# Patient Record
Sex: FEMALE | Race: WHITE | NOT HISPANIC OR LATINO | Employment: OTHER | URBAN - METROPOLITAN AREA
[De-identification: names, ages, dates, MRNs, and addresses within clinical notes are randomized per-mention and may not be internally consistent; named-entity substitution may affect disease eponyms.]

---

## 2017-01-12 ENCOUNTER — TELEPHONE (OUTPATIENT)
Dept: TRANSPLANT | Facility: CLINIC | Age: 77
End: 2017-01-12

## 2017-01-12 NOTE — TELEPHONE ENCOUNTER
Left message for patient to contact office to inform us of her decision regarding our transplant program.    OHT episode will be closed at this time due to pt choice. If patient decides to come to our facility referral process will begin.

## 2019-06-03 NOTE — PROGRESS NOTES
Called and spoke with son Konrad in re procedure time change.  New arrival time for Gen change procedure is now 8am and NOT 6am as prev instructed. Mr Konrad verbalized understanding and stated he will relay message to his mother. No further questions asked.

## 2019-06-04 ENCOUNTER — HOSPITAL ENCOUNTER (OUTPATIENT)
Facility: HOSPITAL | Age: 79
Discharge: HOME | End: 2019-06-05
Attending: INTERNAL MEDICINE | Admitting: INTERNAL MEDICINE
Payer: MEDICARE

## 2019-06-04 DIAGNOSIS — Z45.010 ENCOUNTER FOR PACEMAKER AT END OF BATTERY LIFE: ICD-10-CM

## 2019-06-04 DIAGNOSIS — Z01.818 PREOP TESTING: ICD-10-CM

## 2019-06-04 DIAGNOSIS — Z95.0 PACEMAKER: ICD-10-CM

## 2019-06-04 DIAGNOSIS — I49.9 ARRHYTHMIA: ICD-10-CM

## 2019-06-04 LAB
ANION GAP SERPL CALC-SCNC: 8 MMOL/L (ref 8–16)
APTT BLDCRRT: 30.4 SEC (ref 21–32)
BASOPHILS # BLD AUTO: 0.01 K/UL (ref 0–0.2)
BASOPHILS NFR BLD: 0.2 % (ref 0–1.9)
BUN SERPL-MCNC: 20 MG/DL (ref 8–23)
CALCIUM SERPL-MCNC: 10.2 MG/DL (ref 8.7–10.5)
CHLORIDE SERPL-SCNC: 101 MMOL/L (ref 95–110)
CO2 SERPL-SCNC: 30 MMOL/L (ref 23–29)
CREAT SERPL-MCNC: 1 MG/DL (ref 0.5–1.4)
DIFFERENTIAL METHOD: ABNORMAL
EOSINOPHIL # BLD AUTO: 0.1 K/UL (ref 0–0.5)
EOSINOPHIL NFR BLD: 3.2 % (ref 0–8)
ERYTHROCYTE [DISTWIDTH] IN BLOOD BY AUTOMATED COUNT: 14.5 % (ref 11.5–14.5)
EST. GFR  (AFRICAN AMERICAN): >60 ML/MIN/1.73 M^2
EST. GFR  (NON AFRICAN AMERICAN): 55 ML/MIN/1.73 M^2
GLUCOSE SERPL-MCNC: 96 MG/DL (ref 70–110)
HCT VFR BLD AUTO: 37.9 % (ref 37–48.5)
HGB BLD-MCNC: 11.8 G/DL (ref 12–16)
INR PPP: 1.3 (ref 0.8–1.2)
LYMPHOCYTES # BLD AUTO: 1.7 K/UL (ref 1–4.8)
LYMPHOCYTES NFR BLD: 37.5 % (ref 18–48)
MCH RBC QN AUTO: 28.1 PG (ref 27–31)
MCHC RBC AUTO-ENTMCNC: 31.1 G/DL (ref 32–36)
MCV RBC AUTO: 90 FL (ref 82–98)
MONOCYTES # BLD AUTO: 0.8 K/UL (ref 0.3–1)
MONOCYTES NFR BLD: 18.1 % (ref 4–15)
NEUTROPHILS # BLD AUTO: 1.8 K/UL (ref 1.8–7.7)
NEUTROPHILS NFR BLD: 41 % (ref 38–73)
PLATELET # BLD AUTO: 157 K/UL (ref 150–350)
PMV BLD AUTO: 11 FL (ref 9.2–12.9)
POTASSIUM SERPL-SCNC: 4 MMOL/L (ref 3.5–5.1)
PROTHROMBIN TIME: 13.4 SEC (ref 9–12.5)
RBC # BLD AUTO: 4.2 M/UL (ref 4–5.4)
SODIUM SERPL-SCNC: 139 MMOL/L (ref 136–145)
WBC # BLD AUTO: 4.43 K/UL (ref 3.9–12.7)

## 2019-06-04 PROCEDURE — 99152 MOD SED SAME PHYS/QHP 5/>YRS: CPT | Performed by: INTERNAL MEDICINE

## 2019-06-04 PROCEDURE — 27000221 HC OXYGEN, UP TO 24 HOURS

## 2019-06-04 PROCEDURE — 63600175 PHARM REV CODE 636 W HCPCS: Performed by: INTERNAL MEDICINE

## 2019-06-04 PROCEDURE — 33228 REMV&REPLC PM GEN DUAL LEAD: CPT

## 2019-06-04 PROCEDURE — 99153 MOD SED SAME PHYS/QHP EA: CPT | Performed by: INTERNAL MEDICINE

## 2019-06-04 PROCEDURE — 85025 COMPLETE CBC W/AUTO DIFF WBC: CPT

## 2019-06-04 PROCEDURE — 93010 EKG 12-LEAD: ICD-10-PCS | Mod: 76,,, | Performed by: INTERNAL MEDICINE

## 2019-06-04 PROCEDURE — 93010 ELECTROCARDIOGRAM REPORT: CPT | Mod: ,,, | Performed by: INTERNAL MEDICINE

## 2019-06-04 PROCEDURE — 80048 BASIC METABOLIC PNL TOTAL CA: CPT

## 2019-06-04 PROCEDURE — 27201423 OPTIME MED/SURG SUP & DEVICES STERILE SUPPLY: Performed by: INTERNAL MEDICINE

## 2019-06-04 PROCEDURE — 25000003 PHARM REV CODE 250: Performed by: INTERNAL MEDICINE

## 2019-06-04 PROCEDURE — C1785 PMKR, DUAL, RATE-RESP: HCPCS | Performed by: INTERNAL MEDICINE

## 2019-06-04 PROCEDURE — 36415 COLL VENOUS BLD VENIPUNCTURE: CPT

## 2019-06-04 PROCEDURE — 85730 THROMBOPLASTIN TIME PARTIAL: CPT

## 2019-06-04 PROCEDURE — 93005 ELECTROCARDIOGRAM TRACING: CPT | Mod: 59

## 2019-06-04 PROCEDURE — 85610 PROTHROMBIN TIME: CPT

## 2019-06-04 DEVICE — PULSE GENERATOR
Type: IMPLANTABLE DEVICE | Site: CHEST  WALL | Status: FUNCTIONAL
Brand: ASSURITY MRI™

## 2019-06-04 RX ORDER — CEFAZOLIN SODIUM 1 G/3ML
INJECTION, POWDER, FOR SOLUTION INTRAMUSCULAR; INTRAVENOUS
Status: DISCONTINUED | OUTPATIENT
Start: 2019-06-04 | End: 2019-06-04 | Stop reason: HOSPADM

## 2019-06-04 RX ORDER — FENTANYL CITRATE 50 UG/ML
INJECTION, SOLUTION INTRAMUSCULAR; INTRAVENOUS
Status: DISCONTINUED | OUTPATIENT
Start: 2019-06-04 | End: 2019-06-04 | Stop reason: HOSPADM

## 2019-06-04 RX ORDER — CEFAZOLIN SODIUM 1 G/50ML
1 SOLUTION INTRAVENOUS
Status: DISCONTINUED | OUTPATIENT
Start: 2019-06-04 | End: 2019-06-04

## 2019-06-04 RX ORDER — LORATADINE 10 MG/1
10 TABLET ORAL DAILY PRN
COMMUNITY
End: 2022-01-01 | Stop reason: SDUPTHER

## 2019-06-04 RX ORDER — BACLOFEN 10 MG/1
10 TABLET ORAL
COMMUNITY
End: 2023-01-01

## 2019-06-04 RX ORDER — MIRTAZAPINE 15 MG/1
15 TABLET, ORALLY DISINTEGRATING ORAL NIGHTLY
COMMUNITY
End: 2023-01-01

## 2019-06-04 RX ORDER — METOPROLOL TARTRATE 100 MG/1
50 TABLET ORAL
COMMUNITY
End: 2022-02-22 | Stop reason: DRUGHIGH

## 2019-06-04 RX ORDER — MIDAZOLAM HYDROCHLORIDE 1 MG/ML
INJECTION, SOLUTION INTRAMUSCULAR; INTRAVENOUS
Status: DISCONTINUED | OUTPATIENT
Start: 2019-06-04 | End: 2019-06-04 | Stop reason: HOSPADM

## 2019-06-04 RX ADMIN — DEXTROSE 1 G: 50 INJECTION, SOLUTION INTRAVENOUS at 06:06

## 2019-06-04 NOTE — NURSING
ekg repeated and paced rhythm; st haile rep at bedside to instruct pt on new monitoring device; post procedure orders pending and pt teaching done post procedure and verbalized understanding; will continue to monitor pt; admit tele bed pending

## 2019-06-04 NOTE — PLAN OF CARE
Patient resting quietly in bed; pt aao;sinus juvencio/occ pacer; pt uses constant 02 at home at 3 liters and maintained; skin wm dry color pink; preop teaching completed and questions answered and verbalized understanding; no pain or shortness of breath reported

## 2019-06-04 NOTE — NURSING
Patient to tele room 404 per stretcher with belongings-- clothing, st haile information, o2 tank luggage and meds to son; pt on monitor and paced rhythm noted;pt accompanied by nurse Dixie; pt is aao; no pain; vs stable; pt tolerated po juice; skin wm dry color pink; dressing to left chest dry intact no swelling or bleeding and ice pack intact; report was called to estella on 4a for room 404; iv access intact left a/c

## 2019-06-04 NOTE — PLAN OF CARE
Problem: Adult Inpatient Plan of Care  Goal: Plan of Care Review  Outcome: Ongoing (interventions implemented as appropriate)  Plan of care reviewed with patient. Patient verbalized understanding. Ice pack to L chest at incision site. Left arm immobilized with sling and swathe. Site clean, dry, intact, no drainage present, pt has no complaints of pain at this time. Cefazolin IV infusing. Fall precautions maintained. Bed in lowest position, call light within reach, 2x bed rails, pt wearing slip resistant socks. Patient notified to ask staff for assistance and pt verbalized complete understanding. Pt on telemetry with paced rhythm noted. Will continue to monitor.

## 2019-06-04 NOTE — PLAN OF CARE
Patient received in cath lab recovery per kaz with nurse bojorquez and bedside report received and assumed care; pt aao; paced rhythm on monitor; ice pack applied to left chest procedure site; dressing to left chest dry intact no bleeding no swelling; no pain reported; skin wm dry color pink; iv access maintained; admit bed requested; will continue to monitor patient; post procedure teaching done and verbalized understanding; o2 maintained per nasal cannula

## 2019-06-04 NOTE — H&P
78 year old female here for pacemaker battery change due to GERSON. She has seen me in the past with pacemaker implantation in 2012 for SSS. Her past medical history 1. Mitral valve repair 1st; porcine valve 2007 with TV ring  2. 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 abnormal CT lungs  4. atrial fibrillation on 30 day monitor.  Her medications included, ASA  pravastatin 20mg  thyroid 25 micrograms  coumadin 5 mg  lasix 40 mg  off amiodarone due to eye effects   metoprolol tartate 25 mg 1/2 BID.  She was last seen by me in 2014 and has b een followed by . Pacemaker check noted GERSON and here for battery change    Exam:  Normal S1,S2, MR murmur noted apex  Lungs; clear  Exts; no edema noted    Labs: INR 1.3    Imp: GERSON of pacemaker  Recs: battery change; risks and benefits explained in detail and she understands

## 2019-06-04 NOTE — NURSING
Pt L arm immobilized with sling and swathe, ice applied to site every 15 minutes. Pt HOB elevated to 30 degrees, pt son at bedside to assist patient.

## 2019-06-04 NOTE — PLAN OF CARE
Problem: Adult Inpatient Plan of Care  Goal: Plan of Care Review  Outcome: Ongoing (interventions implemented as appropriate)  Patient on home O2 of 3L.  Will continue to monitor.

## 2019-06-05 ENCOUNTER — NURSE TRIAGE (OUTPATIENT)
Dept: ADMINISTRATIVE | Facility: CLINIC | Age: 79
End: 2019-06-05

## 2019-06-05 VITALS
DIASTOLIC BLOOD PRESSURE: 59 MMHG | TEMPERATURE: 98 F | BODY MASS INDEX: 17.74 KG/M2 | SYSTOLIC BLOOD PRESSURE: 111 MMHG | HEART RATE: 73 BPM | OXYGEN SATURATION: 97 % | WEIGHT: 113 LBS | HEIGHT: 67 IN | RESPIRATION RATE: 18 BRPM

## 2019-06-05 PROCEDURE — 63600175 PHARM REV CODE 636 W HCPCS: Performed by: INTERNAL MEDICINE

## 2019-06-05 PROCEDURE — 94761 N-INVAS EAR/PLS OXIMETRY MLT: CPT

## 2019-06-05 PROCEDURE — 25000003 PHARM REV CODE 250: Performed by: INTERNAL MEDICINE

## 2019-06-05 PROCEDURE — 27000221 HC OXYGEN, UP TO 24 HOURS

## 2019-06-05 RX ORDER — CEPHALEXIN 500 MG/1
500 CAPSULE ORAL 3 TIMES DAILY
Qty: 15 CAPSULE | Refills: 0 | Status: SHIPPED | OUTPATIENT
Start: 2019-06-05 | End: 2019-06-10

## 2019-06-05 RX ADMIN — DEXTROSE 1 G: 50 INJECTION, SOLUTION INTRAVENOUS at 01:06

## 2019-06-05 NOTE — DISCHARGE SUMMARY
78 year old female with pacemaker implantation for SSS in 2012; MV replacement with bioprosthesis and TV ring. Followed now by  who also follows her INR for paroxysmal atrial fibrillation. Pacemaker noted to be at GERSON and electively admitted for battery change.  She underwent a battery change on the 4th with no complications. Pacer site looks normal  Will discharge today on home medications along with keflex 500 TID for 5 days. She will resume her coumadin today and followup with  with INR check on Friday.  She will see me next week for wound check.

## 2019-06-05 NOTE — PLAN OF CARE
Visit with pt for d/c planning.  Independent with ADL's, uses home O2 2Lnc, portable tank at bedside for transport home.  Pt denies any needs at d/c.  Son is available for d/c.    Discharge planning brochure provided. White board updated with CM name & contact info.  Pt encouraged to call with any questions or needs. CM will continue to follow patient throughout the transitions of care, and assist with any discharge needs.       06/05/19 0921   Discharge Assessment   Assessment Type Discharge Planning Assessment   Confirmed/corrected address and phone number on facesheet? Yes   Assessment information obtained from? Patient   Expected Length of Stay (days) 1   Communicated expected length of stay with patient/caregiver yes   Prior to hospitilization cognitive status: Alert/Oriented   Prior to hospitalization functional status: Independent   Current cognitive status: Alert/Oriented   Current Functional Status: Independent   Lives With child(ousmane), adult   Is patient able to care for self after discharge? Yes   Readmission Within the Last 30 Days no previous admission in last 30 days   Patient currently being followed by outpatient case management? No   Patient currently receives any other outside agency services? No   Equipment Currently Used at Home oxygen   Do you have any problems affording any of your prescribed medications? No   Is the patient taking medications as prescribed? yes   Does the patient have transportation home? Yes   Transportation Anticipated family or friend will provide   Does the patient receive services at the [facility]? Yes   Discharge Plan A Home   Discharge Plan B Home with family   DME Needed Upon Discharge  none   Patient/Family in Agreement with Plan yes

## 2019-06-05 NOTE — PROGRESS NOTES
Pacemaker site normal. Would discharge on Keflex 500 TID for 5 days. Resume coumadin today and check INR on Friday. Discussed all this with patient.

## 2019-06-05 NOTE — DISCHARGE INSTRUCTIONS
Pacemakers (English) View Edit Remove   Cephalexin tablets or capsules (English) View Edit Remove   Wound Infection, Recognizing and Treating (English) View Edit Remove

## 2019-06-05 NOTE — PLAN OF CARE
VN note: VN cued into patient's room, son at bedside. Patient unable to see VN on screen. Informed VN that was fine, still ok to review discharge papers. VN reviewed medication list with patient. She was educated on new medication and side effects. Patient will get her prescriptions filled at her preferred pharmacy. VN also educated patient on infection signs and symptoms and when to seek medical attention. VN also educated patient on pacemaker precautions at home. Follow-up information also given. Patient and son verbalized understanding and all questions answered. Refer to clinical references for further education. Transport requested.

## 2019-06-05 NOTE — PLAN OF CARE
D/C rounds complete. All questions answered.  Nurse to discuss d/c medications.  Discussed need to keep f/u appts, adherence to medication regimen for health maintenance, verbalized understanding.    Pt would like to get her keflex filled at her [facility] in [place], [facility] pharmacy delivery at [facility].  Aware she has to f/u with Dr Gallo for wound check next week and PCP Friday for INR check, both appts placed in AVS.  Reviewed s/s which would prompt return to ED, verbalized understanding.       06/05/19 0991   Final Note   Assessment Type Final Discharge Note   Anticipated Discharge Disposition Home   Hospital Follow Up  Appt(s) scheduled? Yes   Discharge plans and expectations educations in teach back method with documentation complete? Yes   Right Care Referral Info   Post Acute Recommendation No Care       Abeba Harper, SAY    [phone]

## 2019-06-05 NOTE — PLAN OF CARE
Problem: Adult Inpatient Plan of Care  Goal: Plan of Care Review  Outcome: Ongoing (interventions implemented as appropriate)     06/04/19 5604   Plan of Care Review   Plan of Care Reviewed With patient   Pt very pleasant, reported slight discomfort.  Nurses replaced ice pack on L CW.  HOB @ 30 degrees til 6/5/19 @ 1130.  Asked for wedge to be replositioned.      Tele: Paced, reg HR 70,  No alarms.     Bed in lowest position, wheels locked, non skid socks, ID band worn, personal items and call bell with in reach, bed alarm set.

## 2019-06-05 NOTE — NURSING
Cued into patient's room for evening rounds. Patient is alert and oriented. In supine position and denies pain at this time. Patient education done on fall precautions. Patient does not have any questions or concerns at this time.bed alarm is maintained with side rails up x2 for patient safety. Will continue to follow and be available when needed

## 2022-01-01 ENCOUNTER — OFFICE VISIT (OUTPATIENT)
Dept: UROLOGY | Facility: CLINIC | Age: 82
End: 2022-01-01
Payer: MEDICARE

## 2022-01-01 ENCOUNTER — INFUSION (OUTPATIENT)
Dept: INFUSION THERAPY | Facility: HOSPITAL | Age: 82
End: 2022-01-01
Attending: INTERNAL MEDICINE
Payer: MEDICARE

## 2022-01-01 ENCOUNTER — LAB VISIT (OUTPATIENT)
Dept: LAB | Facility: HOSPITAL | Age: 82
End: 2022-01-01
Attending: INTERNAL MEDICINE
Payer: MEDICARE

## 2022-01-01 ENCOUNTER — TELEPHONE (OUTPATIENT)
Dept: INFUSION THERAPY | Facility: HOSPITAL | Age: 82
End: 2022-01-01
Payer: MEDICARE

## 2022-01-01 ENCOUNTER — OFFICE VISIT (OUTPATIENT)
Dept: HEMATOLOGY/ONCOLOGY | Facility: CLINIC | Age: 82
End: 2022-01-01
Payer: MEDICARE

## 2022-01-01 ENCOUNTER — TELEPHONE (OUTPATIENT)
Dept: FAMILY MEDICINE | Facility: CLINIC | Age: 82
End: 2022-01-01
Payer: MEDICARE

## 2022-01-01 ENCOUNTER — TELEPHONE (OUTPATIENT)
Dept: UROLOGY | Facility: CLINIC | Age: 82
End: 2022-01-01
Payer: MEDICARE

## 2022-01-01 ENCOUNTER — TELEPHONE (OUTPATIENT)
Dept: HEMATOLOGY/ONCOLOGY | Facility: CLINIC | Age: 82
End: 2022-01-01
Payer: MEDICARE

## 2022-01-01 VITALS
HEIGHT: 67 IN | SYSTOLIC BLOOD PRESSURE: 122 MMHG | OXYGEN SATURATION: 98 % | SYSTOLIC BLOOD PRESSURE: 120 MMHG | DIASTOLIC BLOOD PRESSURE: 68 MMHG | HEART RATE: 70 BPM | BODY MASS INDEX: 13.43 KG/M2 | HEART RATE: 80 BPM | WEIGHT: 86.06 LBS | WEIGHT: 85.75 LBS | DIASTOLIC BLOOD PRESSURE: 62 MMHG | BODY MASS INDEX: 13.51 KG/M2

## 2022-01-01 VITALS
DIASTOLIC BLOOD PRESSURE: 51 MMHG | OXYGEN SATURATION: 97 % | HEART RATE: 70 BPM | BODY MASS INDEX: 13.31 KG/M2 | SYSTOLIC BLOOD PRESSURE: 109 MMHG | HEIGHT: 67 IN | HEIGHT: 67 IN | SYSTOLIC BLOOD PRESSURE: 123 MMHG | WEIGHT: 84.81 LBS | RESPIRATION RATE: 18 BRPM | HEIGHT: 67 IN | HEART RATE: 70 BPM | BODY MASS INDEX: 13.31 KG/M2 | DIASTOLIC BLOOD PRESSURE: 65 MMHG | WEIGHT: 84.81 LBS | OXYGEN SATURATION: 98 % | BODY MASS INDEX: 13.31 KG/M2 | BODY MASS INDEX: 13.31 KG/M2 | WEIGHT: 84.81 LBS | OXYGEN SATURATION: 98 % | RESPIRATION RATE: 20 BRPM | HEIGHT: 67 IN | RESPIRATION RATE: 18 BRPM | WEIGHT: 86.06 LBS | OXYGEN SATURATION: 98 % | DIASTOLIC BLOOD PRESSURE: 55 MMHG | HEART RATE: 69 BPM | TEMPERATURE: 98 F | DIASTOLIC BLOOD PRESSURE: 49 MMHG | SYSTOLIC BLOOD PRESSURE: 108 MMHG | DIASTOLIC BLOOD PRESSURE: 59 MMHG | RESPIRATION RATE: 17 BRPM | TEMPERATURE: 98 F | HEART RATE: 69 BPM | BODY MASS INDEX: 13.51 KG/M2 | SYSTOLIC BLOOD PRESSURE: 116 MMHG | TEMPERATURE: 98 F | TEMPERATURE: 98 F | WEIGHT: 84.81 LBS | HEIGHT: 67 IN | SYSTOLIC BLOOD PRESSURE: 137 MMHG | HEART RATE: 71 BPM

## 2022-01-01 VITALS
BODY MASS INDEX: 13.53 KG/M2 | SYSTOLIC BLOOD PRESSURE: 125 MMHG | TEMPERATURE: 98 F | OXYGEN SATURATION: 98 % | HEART RATE: 74 BPM | HEIGHT: 67 IN | WEIGHT: 86.19 LBS | DIASTOLIC BLOOD PRESSURE: 58 MMHG

## 2022-01-01 VITALS
SYSTOLIC BLOOD PRESSURE: 114 MMHG | HEIGHT: 67 IN | DIASTOLIC BLOOD PRESSURE: 54 MMHG | HEART RATE: 69 BPM | WEIGHT: 87.31 LBS | BODY MASS INDEX: 13.7 KG/M2

## 2022-01-01 DIAGNOSIS — D50.8 OTHER IRON DEFICIENCY ANEMIAS: Primary | ICD-10-CM

## 2022-01-01 DIAGNOSIS — D69.6 THROMBOCYTOPENIA, UNSPECIFIED: ICD-10-CM

## 2022-01-01 DIAGNOSIS — R35.1 NOCTURIA: ICD-10-CM

## 2022-01-01 DIAGNOSIS — D63.8 ANEMIA, CHRONIC DISEASE: ICD-10-CM

## 2022-01-01 DIAGNOSIS — R64 CACHEXIA: ICD-10-CM

## 2022-01-01 DIAGNOSIS — I48.20 CHRONIC ATRIAL FIBRILLATION: ICD-10-CM

## 2022-01-01 DIAGNOSIS — J43.1 PANLOBULAR EMPHYSEMA: ICD-10-CM

## 2022-01-01 DIAGNOSIS — D50.8 OTHER IRON DEFICIENCY ANEMIAS: ICD-10-CM

## 2022-01-01 DIAGNOSIS — N18.31 STAGE 3A CHRONIC KIDNEY DISEASE: ICD-10-CM

## 2022-01-01 DIAGNOSIS — N39.41 URGE INCONTINENCE: ICD-10-CM

## 2022-01-01 DIAGNOSIS — D63.8 ANEMIA, CHRONIC DISEASE: Primary | ICD-10-CM

## 2022-01-01 DIAGNOSIS — N32.81 OVERACTIVE BLADDER: Primary | ICD-10-CM

## 2022-01-01 DIAGNOSIS — R35.0 URINARY FREQUENCY: ICD-10-CM

## 2022-01-01 DIAGNOSIS — D53.9 NUTRITIONAL ANEMIA, UNSPECIFIED: ICD-10-CM

## 2022-01-01 DIAGNOSIS — R39.15 URINARY URGENCY: Primary | ICD-10-CM

## 2022-01-01 DIAGNOSIS — L29.9 ITCHING: ICD-10-CM

## 2022-01-01 DIAGNOSIS — K76.6 PORTOPULMONARY HYPERTENSION: ICD-10-CM

## 2022-01-01 DIAGNOSIS — N32.81 OAB (OVERACTIVE BLADDER): ICD-10-CM

## 2022-01-01 DIAGNOSIS — I27.20 PORTOPULMONARY HYPERTENSION: ICD-10-CM

## 2022-01-01 LAB
ALBUMIN SERPL BCP-MCNC: 4 G/DL (ref 3.5–5.2)
ALBUMIN SERPL BCP-MCNC: 4.4 G/DL (ref 3.5–5.2)
ALP SERPL-CCNC: 61 U/L (ref 55–135)
ALP SERPL-CCNC: 67 U/L (ref 55–135)
ALT SERPL W/O P-5'-P-CCNC: 13 U/L (ref 10–44)
ALT SERPL W/O P-5'-P-CCNC: 13 U/L (ref 10–44)
ANION GAP SERPL CALC-SCNC: 10 MMOL/L (ref 8–16)
ANION GAP SERPL CALC-SCNC: 9 MMOL/L (ref 8–16)
ANISOCYTOSIS BLD QL SMEAR: SLIGHT
AST SERPL-CCNC: 39 U/L (ref 10–40)
AST SERPL-CCNC: 40 U/L (ref 10–40)
BASOPHILS # BLD AUTO: 0.02 K/UL (ref 0–0.2)
BASOPHILS NFR BLD: 0 % (ref 0–1.9)
BASOPHILS NFR BLD: 0.6 % (ref 0–1.9)
BILIRUB SERPL-MCNC: 1.6 MG/DL (ref 0.1–1)
BILIRUB SERPL-MCNC: 1.7 MG/DL (ref 0.1–1)
BUN SERPL-MCNC: 15 MG/DL (ref 8–23)
BUN SERPL-MCNC: 21 MG/DL (ref 8–23)
CALCIUM SERPL-MCNC: 9.4 MG/DL (ref 8.7–10.5)
CALCIUM SERPL-MCNC: 9.8 MG/DL (ref 8.7–10.5)
CHLORIDE SERPL-SCNC: 102 MMOL/L (ref 95–110)
CHLORIDE SERPL-SCNC: 102 MMOL/L (ref 95–110)
CO2 SERPL-SCNC: 27 MMOL/L (ref 23–29)
CO2 SERPL-SCNC: 30 MMOL/L (ref 23–29)
CREAT SERPL-MCNC: 1 MG/DL (ref 0.5–1.4)
CREAT SERPL-MCNC: 1 MG/DL (ref 0.5–1.4)
DIFFERENTIAL METHOD: ABNORMAL
DIFFERENTIAL METHOD: ABNORMAL
EOSINOPHIL # BLD AUTO: 0.1 K/UL (ref 0–0.5)
EOSINOPHIL NFR BLD: 2 % (ref 0–8)
EOSINOPHIL NFR BLD: 2.2 % (ref 0–8)
ERYTHROCYTE [DISTWIDTH] IN BLOOD BY AUTOMATED COUNT: 16 % (ref 11.5–14.5)
ERYTHROCYTE [DISTWIDTH] IN BLOOD BY AUTOMATED COUNT: 19.3 % (ref 11.5–14.5)
EST. GFR  (NO RACE VARIABLE): 57 ML/MIN/1.73 M^2
EST. GFR  (NO RACE VARIABLE): 57 ML/MIN/1.73 M^2
FERRITIN SERPL-MCNC: 146 NG/ML (ref 20–300)
FERRITIN SERPL-MCNC: 180 NG/ML (ref 20–300)
GLUCOSE SERPL-MCNC: 102 MG/DL (ref 70–110)
GLUCOSE SERPL-MCNC: 94 MG/DL (ref 70–110)
HCT VFR BLD AUTO: 27 % (ref 37–48.5)
HCT VFR BLD AUTO: 29.8 % (ref 37–48.5)
HGB BLD-MCNC: 8.3 G/DL (ref 12–16)
HGB BLD-MCNC: 9.3 G/DL (ref 12–16)
IMM GRANULOCYTES # BLD AUTO: 0.01 K/UL (ref 0–0.04)
IMM GRANULOCYTES # BLD AUTO: ABNORMAL K/UL (ref 0–0.04)
IMM GRANULOCYTES NFR BLD AUTO: 0.3 % (ref 0–0.5)
IMM GRANULOCYTES NFR BLD AUTO: ABNORMAL % (ref 0–0.5)
IRON SERPL-MCNC: 57 UG/DL (ref 30–160)
IRON SERPL-MCNC: 58 UG/DL (ref 30–160)
LYMPHOCYTES # BLD AUTO: 0.8 K/UL (ref 1–4.8)
LYMPHOCYTES NFR BLD: 16 % (ref 18–48)
LYMPHOCYTES NFR BLD: 22.6 % (ref 18–48)
MCH RBC QN AUTO: 30.1 PG (ref 27–31)
MCH RBC QN AUTO: 30.2 PG (ref 27–31)
MCHC RBC AUTO-ENTMCNC: 30.7 G/DL (ref 32–36)
MCHC RBC AUTO-ENTMCNC: 31.2 G/DL (ref 32–36)
MCV RBC AUTO: 96 FL (ref 82–98)
MCV RBC AUTO: 98 FL (ref 82–98)
MONOCYTES # BLD AUTO: 0.5 K/UL (ref 0.3–1)
MONOCYTES NFR BLD: 14.6 % (ref 4–15)
MONOCYTES NFR BLD: 8 % (ref 4–15)
NEUTROPHILS # BLD AUTO: 2.2 K/UL (ref 1.8–7.7)
NEUTROPHILS NFR BLD: 59.7 % (ref 38–73)
NEUTROPHILS NFR BLD: 72 % (ref 38–73)
NEUTS BAND NFR BLD MANUAL: 2 %
NRBC BLD-RTO: 0 /100 WBC
NRBC BLD-RTO: 0 /100 WBC
PLATELET # BLD AUTO: 105 K/UL (ref 150–450)
PLATELET # BLD AUTO: 141 K/UL (ref 150–450)
PLATELET BLD QL SMEAR: ABNORMAL
PMV BLD AUTO: ABNORMAL FL (ref 9.2–12.9)
PMV BLD AUTO: ABNORMAL FL (ref 9.2–12.9)
POIKILOCYTOSIS BLD QL SMEAR: SLIGHT
POTASSIUM SERPL-SCNC: 4.5 MMOL/L (ref 3.5–5.1)
POTASSIUM SERPL-SCNC: 4.9 MMOL/L (ref 3.5–5.1)
PROT SERPL-MCNC: 7.1 G/DL (ref 6–8.4)
PROT SERPL-MCNC: 7.3 G/DL (ref 6–8.4)
RBC # BLD AUTO: 2.75 M/UL (ref 4–5.4)
RBC # BLD AUTO: 3.09 M/UL (ref 4–5.4)
SATURATED IRON: 18 % (ref 20–50)
SATURATED IRON: 18 % (ref 20–50)
SCHISTOCYTES BLD QL SMEAR: PRESENT
SODIUM SERPL-SCNC: 139 MMOL/L (ref 136–145)
SODIUM SERPL-SCNC: 141 MMOL/L (ref 136–145)
TOTAL IRON BINDING CAPACITY: 318 UG/DL (ref 250–450)
TOTAL IRON BINDING CAPACITY: 327 UG/DL (ref 250–450)
TRANSFERRIN SERPL-MCNC: 215 MG/DL (ref 200–375)
TRANSFERRIN SERPL-MCNC: 221 MG/DL (ref 200–375)
WBC # BLD AUTO: 3.63 K/UL (ref 3.9–12.7)
WBC # BLD AUTO: 4.54 K/UL (ref 3.9–12.7)

## 2022-01-01 PROCEDURE — 1159F PR MEDICATION LIST DOCUMENTED IN MEDICAL RECORD: ICD-10-PCS | Mod: CPTII,S$GLB,, | Performed by: UROLOGY

## 2022-01-01 PROCEDURE — 82728 ASSAY OF FERRITIN: CPT | Performed by: INTERNAL MEDICINE

## 2022-01-01 PROCEDURE — 36415 COLL VENOUS BLD VENIPUNCTURE: CPT | Performed by: NURSE PRACTITIONER

## 2022-01-01 PROCEDURE — 1157F ADVNC CARE PLAN IN RCRD: CPT | Mod: CPTII,S$GLB,, | Performed by: NURSE PRACTITIONER

## 2022-01-01 PROCEDURE — 1126F PR PAIN SEVERITY QUANTIFIED, NO PAIN PRESENT: ICD-10-PCS | Mod: CPTII,S$GLB,, | Performed by: UROLOGY

## 2022-01-01 PROCEDURE — 99204 OFFICE O/P NEW MOD 45 MIN: CPT | Mod: S$GLB,,, | Performed by: UROLOGY

## 2022-01-01 PROCEDURE — 1157F PR ADVANCE CARE PLAN OR EQUIV PRESENT IN MEDICAL RECORD: ICD-10-PCS | Mod: CPTII,S$GLB,, | Performed by: NURSE PRACTITIONER

## 2022-01-01 PROCEDURE — 1159F MED LIST DOCD IN RCRD: CPT | Mod: CPTII,S$GLB,, | Performed by: UROLOGY

## 2022-01-01 PROCEDURE — 3074F SYST BP LT 130 MM HG: CPT | Mod: CPTII,S$GLB,, | Performed by: UROLOGY

## 2022-01-01 PROCEDURE — 1101F PR PT FALLS ASSESS DOC 0-1 FALLS W/OUT INJ PAST YR: ICD-10-PCS | Mod: CPTII,S$GLB,, | Performed by: UROLOGY

## 2022-01-01 PROCEDURE — 99999 PR PBB SHADOW E&M-EST. PATIENT-LVL V: CPT | Mod: PBBFAC,,, | Performed by: NURSE PRACTITIONER

## 2022-01-01 PROCEDURE — A4216 STERILE WATER/SALINE, 10 ML: HCPCS | Performed by: NURSE PRACTITIONER

## 2022-01-01 PROCEDURE — 99204 PR OFFICE/OUTPT VISIT, NEW, LEVL IV, 45-59 MIN: ICD-10-PCS | Mod: S$GLB,,, | Performed by: UROLOGY

## 2022-01-01 PROCEDURE — 3078F PR MOST RECENT DIASTOLIC BLOOD PRESSURE < 80 MM HG: ICD-10-PCS | Mod: CPTII,S$GLB,, | Performed by: UROLOGY

## 2022-01-01 PROCEDURE — 99999 PR PBB SHADOW E&M-EST. PATIENT-LVL IV: ICD-10-PCS | Mod: PBBFAC,,, | Performed by: UROLOGY

## 2022-01-01 PROCEDURE — 3288F FALL RISK ASSESSMENT DOCD: CPT | Mod: CPTII,S$GLB,, | Performed by: NURSE PRACTITIONER

## 2022-01-01 PROCEDURE — 99215 OFFICE O/P EST HI 40 MIN: CPT | Mod: S$GLB,,, | Performed by: NURSE PRACTITIONER

## 2022-01-01 PROCEDURE — 3078F DIAST BP <80 MM HG: CPT | Mod: CPTII,S$GLB,, | Performed by: UROLOGY

## 2022-01-01 PROCEDURE — 25000003 PHARM REV CODE 250: Performed by: NURSE PRACTITIONER

## 2022-01-01 PROCEDURE — 1126F PR PAIN SEVERITY QUANTIFIED, NO PAIN PRESENT: ICD-10-PCS | Mod: CPTII,S$GLB,, | Performed by: NURSE PRACTITIONER

## 2022-01-01 PROCEDURE — 1126F AMNT PAIN NOTED NONE PRSNT: CPT | Mod: CPTII,S$GLB,, | Performed by: UROLOGY

## 2022-01-01 PROCEDURE — 84466 ASSAY OF TRANSFERRIN: CPT | Performed by: NURSE PRACTITIONER

## 2022-01-01 PROCEDURE — 1157F PR ADVANCE CARE PLAN OR EQUIV PRESENT IN MEDICAL RECORD: ICD-10-PCS | Mod: CPTII,S$GLB,, | Performed by: UROLOGY

## 2022-01-01 PROCEDURE — 85025 COMPLETE CBC W/AUTO DIFF WBC: CPT | Performed by: INTERNAL MEDICINE

## 2022-01-01 PROCEDURE — 3074F PR MOST RECENT SYSTOLIC BLOOD PRESSURE < 130 MM HG: ICD-10-PCS | Mod: CPTII,S$GLB,, | Performed by: NURSE PRACTITIONER

## 2022-01-01 PROCEDURE — 1101F PR PT FALLS ASSESS DOC 0-1 FALLS W/OUT INJ PAST YR: ICD-10-PCS | Mod: CPTII,S$GLB,, | Performed by: NURSE PRACTITIONER

## 2022-01-01 PROCEDURE — 99214 OFFICE O/P EST MOD 30 MIN: CPT | Mod: S$GLB,,, | Performed by: UROLOGY

## 2022-01-01 PROCEDURE — 1101F PT FALLS ASSESS-DOCD LE1/YR: CPT | Mod: CPTII,S$GLB,, | Performed by: UROLOGY

## 2022-01-01 PROCEDURE — 1160F PR REVIEW ALL MEDS BY PRESCRIBER/CLIN PHARMACIST DOCUMENTED: ICD-10-PCS | Mod: CPTII,S$GLB,, | Performed by: NURSE PRACTITIONER

## 2022-01-01 PROCEDURE — 99214 PR OFFICE/OUTPT VISIT, EST, LEVL IV, 30-39 MIN: ICD-10-PCS | Mod: S$GLB,,, | Performed by: UROLOGY

## 2022-01-01 PROCEDURE — 3288F PR FALLS RISK ASSESSMENT DOCUMENTED: ICD-10-PCS | Mod: CPTII,S$GLB,, | Performed by: NURSE PRACTITIONER

## 2022-01-01 PROCEDURE — 96365 THER/PROPH/DIAG IV INF INIT: CPT

## 2022-01-01 PROCEDURE — 99213 OFFICE O/P EST LOW 20 MIN: CPT | Mod: S$GLB,,, | Performed by: UROLOGY

## 2022-01-01 PROCEDURE — 1159F PR MEDICATION LIST DOCUMENTED IN MEDICAL RECORD: ICD-10-PCS | Mod: CPTII,S$GLB,, | Performed by: NURSE PRACTITIONER

## 2022-01-01 PROCEDURE — 82728 ASSAY OF FERRITIN: CPT | Performed by: NURSE PRACTITIONER

## 2022-01-01 PROCEDURE — 3288F PR FALLS RISK ASSESSMENT DOCUMENTED: ICD-10-PCS | Mod: CPTII,S$GLB,, | Performed by: UROLOGY

## 2022-01-01 PROCEDURE — 85025 COMPLETE CBC W/AUTO DIFF WBC: CPT | Performed by: NURSE PRACTITIONER

## 2022-01-01 PROCEDURE — 99213 PR OFFICE/OUTPT VISIT, EST, LEVL III, 20-29 MIN: ICD-10-PCS | Mod: S$GLB,,, | Performed by: UROLOGY

## 2022-01-01 PROCEDURE — 3078F DIAST BP <80 MM HG: CPT | Mod: CPTII,S$GLB,, | Performed by: NURSE PRACTITIONER

## 2022-01-01 PROCEDURE — 99999 PR PBB SHADOW E&M-EST. PATIENT-LVL V: ICD-10-PCS | Mod: PBBFAC,,, | Performed by: NURSE PRACTITIONER

## 2022-01-01 PROCEDURE — 1157F ADVNC CARE PLAN IN RCRD: CPT | Mod: CPTII,S$GLB,, | Performed by: UROLOGY

## 2022-01-01 PROCEDURE — 3078F PR MOST RECENT DIASTOLIC BLOOD PRESSURE < 80 MM HG: ICD-10-PCS | Mod: CPTII,S$GLB,, | Performed by: NURSE PRACTITIONER

## 2022-01-01 PROCEDURE — 1159F MED LIST DOCD IN RCRD: CPT | Mod: CPTII,S$GLB,, | Performed by: NURSE PRACTITIONER

## 2022-01-01 PROCEDURE — 63600175 PHARM REV CODE 636 W HCPCS: Performed by: NURSE PRACTITIONER

## 2022-01-01 PROCEDURE — 99999 PR PBB SHADOW E&M-EST. PATIENT-LVL IV: CPT | Mod: PBBFAC,,, | Performed by: UROLOGY

## 2022-01-01 PROCEDURE — 84466 ASSAY OF TRANSFERRIN: CPT | Performed by: INTERNAL MEDICINE

## 2022-01-01 PROCEDURE — 3074F PR MOST RECENT SYSTOLIC BLOOD PRESSURE < 130 MM HG: ICD-10-PCS | Mod: CPTII,S$GLB,, | Performed by: UROLOGY

## 2022-01-01 PROCEDURE — 80053 COMPREHEN METABOLIC PANEL: CPT | Performed by: INTERNAL MEDICINE

## 2022-01-01 PROCEDURE — 3288F FALL RISK ASSESSMENT DOCD: CPT | Mod: CPTII,S$GLB,, | Performed by: UROLOGY

## 2022-01-01 PROCEDURE — 3074F SYST BP LT 130 MM HG: CPT | Mod: CPTII,S$GLB,, | Performed by: NURSE PRACTITIONER

## 2022-01-01 PROCEDURE — 99999 PR PBB SHADOW E&M-EST. PATIENT-LVL III: ICD-10-PCS | Mod: PBBFAC,,, | Performed by: UROLOGY

## 2022-01-01 PROCEDURE — 80053 COMPREHEN METABOLIC PANEL: CPT | Performed by: NURSE PRACTITIONER

## 2022-01-01 PROCEDURE — 1160F RVW MEDS BY RX/DR IN RCRD: CPT | Mod: CPTII,S$GLB,, | Performed by: NURSE PRACTITIONER

## 2022-01-01 PROCEDURE — 1101F PT FALLS ASSESS-DOCD LE1/YR: CPT | Mod: CPTII,S$GLB,, | Performed by: NURSE PRACTITIONER

## 2022-01-01 PROCEDURE — 99999 PR PBB SHADOW E&M-EST. PATIENT-LVL III: CPT | Mod: PBBFAC,,, | Performed by: UROLOGY

## 2022-01-01 PROCEDURE — 99215 PR OFFICE/OUTPT VISIT, EST, LEVL V, 40-54 MIN: ICD-10-PCS | Mod: S$GLB,,, | Performed by: NURSE PRACTITIONER

## 2022-01-01 PROCEDURE — 1126F AMNT PAIN NOTED NONE PRSNT: CPT | Mod: CPTII,S$GLB,, | Performed by: NURSE PRACTITIONER

## 2022-01-01 PROCEDURE — 36415 COLL VENOUS BLD VENIPUNCTURE: CPT | Performed by: INTERNAL MEDICINE

## 2022-01-01 RX ORDER — SODIUM CHLORIDE 0.9 % (FLUSH) 0.9 %
10 SYRINGE (ML) INJECTION
Status: DISCONTINUED | OUTPATIENT
Start: 2022-01-01 | End: 2022-01-01 | Stop reason: HOSPADM

## 2022-01-01 RX ORDER — HEPARIN 100 UNIT/ML
500 SYRINGE INTRAVENOUS
Status: CANCELLED | OUTPATIENT
Start: 2023-01-01

## 2022-01-01 RX ORDER — SODIUM CHLORIDE 0.9 % (FLUSH) 0.9 %
10 SYRINGE (ML) INJECTION
Status: CANCELLED | OUTPATIENT
Start: 2022-01-01

## 2022-01-01 RX ORDER — WARFARIN SODIUM 5 MG/1
TABLET ORAL
COMMUNITY
Start: 2022-01-01

## 2022-01-01 RX ORDER — SODIUM CHLORIDE 0.9 % (FLUSH) 0.9 %
10 SYRINGE (ML) INJECTION
Status: CANCELLED | OUTPATIENT
Start: 2023-01-01

## 2022-01-01 RX ORDER — HEPARIN 100 UNIT/ML
500 SYRINGE INTRAVENOUS
Status: CANCELLED | OUTPATIENT
Start: 2022-01-01

## 2022-01-01 RX ORDER — CYPROHEPTADINE HYDROCHLORIDE 4 MG/1
4 TABLET ORAL 3 TIMES DAILY PRN
COMMUNITY

## 2022-01-01 RX ORDER — LORATADINE 10 MG/1
10 TABLET ORAL DAILY PRN
Qty: 30 TABLET | Refills: 0 | Status: SHIPPED | OUTPATIENT
Start: 2022-01-01 | End: 2023-01-01

## 2022-01-01 RX ORDER — OXYBUTYNIN CHLORIDE 15 MG/1
15 TABLET, EXTENDED RELEASE ORAL DAILY
Qty: 30 TABLET | Refills: 11 | Status: SHIPPED | OUTPATIENT
Start: 2022-01-01 | End: 2022-01-01 | Stop reason: ALTCHOICE

## 2022-01-01 RX ORDER — WARFARIN 3 MG/1
TABLET ORAL
COMMUNITY
Start: 2022-01-01 | End: 2023-01-01

## 2022-01-01 RX ADMIN — SODIUM CHLORIDE: 9 INJECTION, SOLUTION INTRAVENOUS at 01:11

## 2022-01-01 RX ADMIN — Medication 10 ML: at 03:11

## 2022-01-01 RX ADMIN — IRON SUCROSE 100 MG: 20 INJECTION, SOLUTION INTRAVENOUS at 01:11

## 2022-01-01 RX ADMIN — Medication 10 ML: at 02:11

## 2022-01-01 RX ADMIN — IRON SUCROSE 100 MG: 20 INJECTION, SOLUTION INTRAVENOUS at 02:11

## 2022-02-14 ENCOUNTER — TELEPHONE (OUTPATIENT)
Dept: HEMATOLOGY/ONCOLOGY | Facility: CLINIC | Age: 82
End: 2022-02-14
Payer: MEDICARE

## 2022-02-14 NOTE — TELEPHONE ENCOUNTER
----- Message from Yordan Walsh sent at 2/14/2022  2:11 PM CST -----  Type:  Facility Requesting Call Back    Who Called:[facility] for patient  Would the patient rather a call back or a response via [facility]? Call back  Best Call Back Number:[phone]  Additional Information: Facility Requesting Call Back regarding patients's appointment.

## 2022-02-22 ENCOUNTER — LAB VISIT (OUTPATIENT)
Dept: LAB | Facility: HOSPITAL | Age: 82
End: 2022-02-22
Attending: INTERNAL MEDICINE
Payer: MEDICARE

## 2022-02-22 ENCOUNTER — OFFICE VISIT (OUTPATIENT)
Dept: HEMATOLOGY/ONCOLOGY | Facility: CLINIC | Age: 82
End: 2022-02-22
Payer: MEDICARE

## 2022-02-22 VITALS
BODY MASS INDEX: 13.95 KG/M2 | DIASTOLIC BLOOD PRESSURE: 60 MMHG | TEMPERATURE: 98 F | RESPIRATION RATE: 20 BRPM | HEART RATE: 70 BPM | HEIGHT: 67 IN | WEIGHT: 88.88 LBS | SYSTOLIC BLOOD PRESSURE: 108 MMHG | OXYGEN SATURATION: 99 %

## 2022-02-22 DIAGNOSIS — D63.8 ANEMIA, CHRONIC DISEASE: ICD-10-CM

## 2022-02-22 DIAGNOSIS — D53.9 NUTRITIONAL ANEMIA, UNSPECIFIED: ICD-10-CM

## 2022-02-22 DIAGNOSIS — R64 CACHEXIA: ICD-10-CM

## 2022-02-22 LAB
ALBUMIN SERPL BCP-MCNC: 4.4 G/DL (ref 3.5–5.2)
ALP SERPL-CCNC: 47 U/L (ref 55–135)
ALT SERPL W/O P-5'-P-CCNC: 13 U/L (ref 10–44)
ANION GAP SERPL CALC-SCNC: 8 MMOL/L (ref 8–16)
ANISOCYTOSIS BLD QL SMEAR: SLIGHT
AST SERPL-CCNC: 46 U/L (ref 10–40)
BASOPHILS # BLD AUTO: 0.02 K/UL (ref 0–0.2)
BASOPHILS NFR BLD: 0.5 % (ref 0–1.9)
BILIRUB SERPL-MCNC: 1.8 MG/DL (ref 0.1–1)
BUN SERPL-MCNC: 16 MG/DL (ref 8–23)
CALCIUM SERPL-MCNC: 9.9 MG/DL (ref 8.7–10.5)
CHLORIDE SERPL-SCNC: 103 MMOL/L (ref 95–110)
CO2 SERPL-SCNC: 28 MMOL/L (ref 23–29)
CREAT SERPL-MCNC: 1.1 MG/DL (ref 0.5–1.4)
DIFFERENTIAL METHOD: ABNORMAL
EOSINOPHIL # BLD AUTO: 0.1 K/UL (ref 0–0.5)
EOSINOPHIL NFR BLD: 1.8 % (ref 0–8)
ERYTHROCYTE [DISTWIDTH] IN BLOOD BY AUTOMATED COUNT: 17.6 % (ref 11.5–14.5)
ERYTHROCYTE [SEDIMENTATION RATE] IN BLOOD BY WESTERGREN METHOD: 16 MM/HR (ref 0–20)
EST. GFR  (AFRICAN AMERICAN): 55 ML/MIN/1.73 M^2
EST. GFR  (NON AFRICAN AMERICAN): 48 ML/MIN/1.73 M^2
FERRITIN SERPL-MCNC: 31 NG/ML (ref 20–300)
FOLATE SERPL-MCNC: 7.6 NG/ML (ref 4–24)
GLUCOSE SERPL-MCNC: 88 MG/DL (ref 70–110)
HCT VFR BLD AUTO: 27.3 % (ref 37–48.5)
HGB BLD-MCNC: 8.6 G/DL (ref 12–16)
IMM GRANULOCYTES # BLD AUTO: 0.02 K/UL (ref 0–0.04)
IMM GRANULOCYTES NFR BLD AUTO: 0.5 % (ref 0–0.5)
IRON SERPL-MCNC: 43 UG/DL (ref 30–160)
LDH SERPL L TO P-CCNC: 1142 U/L (ref 110–260)
LYMPHOCYTES # BLD AUTO: 0.8 K/UL (ref 1–4.8)
LYMPHOCYTES NFR BLD: 18.6 % (ref 18–48)
MCH RBC QN AUTO: 31.2 PG (ref 27–31)
MCHC RBC AUTO-ENTMCNC: 31.5 G/DL (ref 32–36)
MCV RBC AUTO: 99 FL (ref 82–98)
MONOCYTES # BLD AUTO: 0.5 K/UL (ref 0.3–1)
MONOCYTES NFR BLD: 11.3 % (ref 4–15)
NEUTROPHILS # BLD AUTO: 2.9 K/UL (ref 1.8–7.7)
NEUTROPHILS NFR BLD: 67.3 % (ref 38–73)
NRBC BLD-RTO: 0 /100 WBC
PLATELET # BLD AUTO: 133 K/UL (ref 150–450)
PLATELET BLD QL SMEAR: ABNORMAL
PMV BLD AUTO: ABNORMAL FL (ref 9.2–12.9)
POIKILOCYTOSIS BLD QL SMEAR: SLIGHT
POTASSIUM SERPL-SCNC: 4.7 MMOL/L (ref 3.5–5.1)
PROT SERPL-MCNC: 7.1 G/DL (ref 6–8.4)
RBC # BLD AUTO: 2.76 M/UL (ref 4–5.4)
RETICS/RBC NFR AUTO: 2.8 % (ref 0.5–2.5)
SATURATED IRON: 11 % (ref 20–50)
SCHISTOCYTES BLD QL SMEAR: ABNORMAL
SCHISTOCYTES BLD QL SMEAR: PRESENT
SODIUM SERPL-SCNC: 139 MMOL/L (ref 136–145)
TOTAL IRON BINDING CAPACITY: 385 UG/DL (ref 250–450)
TRANSFERRIN SERPL-MCNC: 260 MG/DL (ref 200–375)
TSH SERPL DL<=0.005 MIU/L-ACNC: 1.22 UIU/ML (ref 0.4–4)
VIT B12 SERPL-MCNC: >2000 PG/ML (ref 210–950)
WBC # BLD AUTO: 4.35 K/UL (ref 3.9–12.7)

## 2022-02-22 PROCEDURE — 82607 VITAMIN B-12: CPT | Performed by: INTERNAL MEDICINE

## 2022-02-22 PROCEDURE — 87517 HEPATITIS B DNA QUANT: CPT | Performed by: INTERNAL MEDICINE

## 2022-02-22 PROCEDURE — 83520 IMMUNOASSAY QUANT NOS NONAB: CPT | Mod: 59 | Performed by: INTERNAL MEDICINE

## 2022-02-22 PROCEDURE — 82728 ASSAY OF FERRITIN: CPT | Performed by: INTERNAL MEDICINE

## 2022-02-22 PROCEDURE — 1157F PR ADVANCE CARE PLAN OR EQUIV PRESENT IN MEDICAL RECORD: ICD-10-PCS | Mod: CPTII,S$GLB,, | Performed by: INTERNAL MEDICINE

## 2022-02-22 PROCEDURE — 85652 RBC SED RATE AUTOMATED: CPT | Performed by: INTERNAL MEDICINE

## 2022-02-22 PROCEDURE — 1101F PT FALLS ASSESS-DOCD LE1/YR: CPT | Mod: CPTII,S$GLB,, | Performed by: INTERNAL MEDICINE

## 2022-02-22 PROCEDURE — 87389 HIV-1 AG W/HIV-1&-2 AB AG IA: CPT | Performed by: INTERNAL MEDICINE

## 2022-02-22 PROCEDURE — 84165 PROTEIN E-PHORESIS SERUM: CPT | Performed by: INTERNAL MEDICINE

## 2022-02-22 PROCEDURE — 86334 IMMUNOFIX E-PHORESIS SERUM: CPT | Performed by: INTERNAL MEDICINE

## 2022-02-22 PROCEDURE — 86334 IMMUNOFIX E-PHORESIS SERUM: CPT | Mod: 26,,, | Performed by: PATHOLOGY

## 2022-02-22 PROCEDURE — 80053 COMPREHEN METABOLIC PANEL: CPT | Performed by: INTERNAL MEDICINE

## 2022-02-22 PROCEDURE — 3288F FALL RISK ASSESSMENT DOCD: CPT | Mod: CPTII,S$GLB,, | Performed by: INTERNAL MEDICINE

## 2022-02-22 PROCEDURE — 99205 OFFICE O/P NEW HI 60 MIN: CPT | Mod: S$GLB,,, | Performed by: INTERNAL MEDICINE

## 2022-02-22 PROCEDURE — 3074F SYST BP LT 130 MM HG: CPT | Mod: CPTII,S$GLB,, | Performed by: INTERNAL MEDICINE

## 2022-02-22 PROCEDURE — 87522 HEPATITIS C REVRS TRNSCRPJ: CPT | Performed by: INTERNAL MEDICINE

## 2022-02-22 PROCEDURE — 3078F DIAST BP <80 MM HG: CPT | Mod: CPTII,S$GLB,, | Performed by: INTERNAL MEDICINE

## 2022-02-22 PROCEDURE — 36415 COLL VENOUS BLD VENIPUNCTURE: CPT | Performed by: INTERNAL MEDICINE

## 2022-02-22 PROCEDURE — 99205 PR OFFICE/OUTPT VISIT, NEW, LEVL V, 60-74 MIN: ICD-10-PCS | Mod: S$GLB,,, | Performed by: INTERNAL MEDICINE

## 2022-02-22 PROCEDURE — 1157F ADVNC CARE PLAN IN RCRD: CPT | Mod: CPTII,S$GLB,, | Performed by: INTERNAL MEDICINE

## 2022-02-22 PROCEDURE — 1101F PR PT FALLS ASSESS DOC 0-1 FALLS W/OUT INJ PAST YR: ICD-10-PCS | Mod: CPTII,S$GLB,, | Performed by: INTERNAL MEDICINE

## 2022-02-22 PROCEDURE — 1159F MED LIST DOCD IN RCRD: CPT | Mod: CPTII,S$GLB,, | Performed by: INTERNAL MEDICINE

## 2022-02-22 PROCEDURE — 86704 HEP B CORE ANTIBODY TOTAL: CPT | Performed by: INTERNAL MEDICINE

## 2022-02-22 PROCEDURE — 1126F PR PAIN SEVERITY QUANTIFIED, NO PAIN PRESENT: ICD-10-PCS | Mod: CPTII,S$GLB,, | Performed by: INTERNAL MEDICINE

## 2022-02-22 PROCEDURE — 85025 COMPLETE CBC W/AUTO DIFF WBC: CPT | Performed by: INTERNAL MEDICINE

## 2022-02-22 PROCEDURE — 99999 PR PBB SHADOW E&M-EST. PATIENT-LVL III: ICD-10-PCS | Mod: PBBFAC,,, | Performed by: INTERNAL MEDICINE

## 2022-02-22 PROCEDURE — 83615 LACTATE (LD) (LDH) ENZYME: CPT | Performed by: INTERNAL MEDICINE

## 2022-02-22 PROCEDURE — 84443 ASSAY THYROID STIM HORMONE: CPT | Performed by: INTERNAL MEDICINE

## 2022-02-22 PROCEDURE — 3078F PR MOST RECENT DIASTOLIC BLOOD PRESSURE < 80 MM HG: ICD-10-PCS | Mod: CPTII,S$GLB,, | Performed by: INTERNAL MEDICINE

## 2022-02-22 PROCEDURE — 82746 ASSAY OF FOLIC ACID SERUM: CPT | Performed by: INTERNAL MEDICINE

## 2022-02-22 PROCEDURE — 87340 HEPATITIS B SURFACE AG IA: CPT | Performed by: INTERNAL MEDICINE

## 2022-02-22 PROCEDURE — 86334 PATHOLOGIST INTERPRETATION IFE: ICD-10-PCS | Mod: 26,,, | Performed by: PATHOLOGY

## 2022-02-22 PROCEDURE — 3074F PR MOST RECENT SYSTOLIC BLOOD PRESSURE < 130 MM HG: ICD-10-PCS | Mod: CPTII,S$GLB,, | Performed by: INTERNAL MEDICINE

## 2022-02-22 PROCEDURE — 1159F PR MEDICATION LIST DOCUMENTED IN MEDICAL RECORD: ICD-10-PCS | Mod: CPTII,S$GLB,, | Performed by: INTERNAL MEDICINE

## 2022-02-22 PROCEDURE — 1160F RVW MEDS BY RX/DR IN RCRD: CPT | Mod: CPTII,S$GLB,, | Performed by: INTERNAL MEDICINE

## 2022-02-22 PROCEDURE — 84466 ASSAY OF TRANSFERRIN: CPT | Performed by: INTERNAL MEDICINE

## 2022-02-22 PROCEDURE — 99999 PR PBB SHADOW E&M-EST. PATIENT-LVL III: CPT | Mod: PBBFAC,,, | Performed by: INTERNAL MEDICINE

## 2022-02-22 PROCEDURE — 1160F PR REVIEW ALL MEDS BY PRESCRIBER/CLIN PHARMACIST DOCUMENTED: ICD-10-PCS | Mod: CPTII,S$GLB,, | Performed by: INTERNAL MEDICINE

## 2022-02-22 PROCEDURE — 86706 HEP B SURFACE ANTIBODY: CPT | Performed by: INTERNAL MEDICINE

## 2022-02-22 PROCEDURE — 82668 ASSAY OF ERYTHROPOIETIN: CPT | Performed by: INTERNAL MEDICINE

## 2022-02-22 PROCEDURE — 85045 AUTOMATED RETICULOCYTE COUNT: CPT | Performed by: INTERNAL MEDICINE

## 2022-02-22 PROCEDURE — 84165 PATHOLOGIST INTERPRETATION SPE: ICD-10-PCS | Mod: 26,,, | Performed by: PATHOLOGY

## 2022-02-22 PROCEDURE — 3288F PR FALLS RISK ASSESSMENT DOCUMENTED: ICD-10-PCS | Mod: CPTII,S$GLB,, | Performed by: INTERNAL MEDICINE

## 2022-02-22 PROCEDURE — 86038 ANTINUCLEAR ANTIBODIES: CPT | Performed by: INTERNAL MEDICINE

## 2022-02-22 PROCEDURE — 84165 PROTEIN E-PHORESIS SERUM: CPT | Mod: 26,,, | Performed by: PATHOLOGY

## 2022-02-22 PROCEDURE — 1126F AMNT PAIN NOTED NONE PRSNT: CPT | Mod: CPTII,S$GLB,, | Performed by: INTERNAL MEDICINE

## 2022-02-22 RX ORDER — LEVOTHYROXINE SODIUM 75 UG/1
75 TABLET ORAL DAILY
COMMUNITY
Start: 2021-11-01 | End: 2023-01-01 | Stop reason: SDUPTHER

## 2022-02-22 RX ORDER — METOPROLOL TARTRATE 50 MG/1
50 TABLET ORAL 2 TIMES DAILY
COMMUNITY
Start: 2021-07-27

## 2022-02-22 RX ORDER — MEGESTROL ACETATE 40 MG/1
40 TABLET ORAL 3 TIMES DAILY
COMMUNITY
Start: 2022-01-13 | End: 2023-01-01

## 2022-02-22 RX ORDER — ASCORBIC ACID 125 MG
TABLET,CHEWABLE ORAL
COMMUNITY
Start: 2021-07-27

## 2022-02-22 RX ORDER — FOLIC ACID 1 MG/1
1000 TABLET ORAL DAILY
COMMUNITY
Start: 2022-02-18 | End: 2023-01-01

## 2022-02-22 RX ORDER — FUROSEMIDE 40 MG/1
40 TABLET ORAL DAILY PRN
COMMUNITY
Start: 2021-07-28

## 2022-02-22 RX ORDER — ACETAMINOPHEN 500 MG
2000 TABLET ORAL
COMMUNITY
Start: 2021-07-27 | End: 2023-01-01 | Stop reason: SDUPTHER

## 2022-02-22 NOTE — PROGRESS NOTES
"PATIENT: Noemy Niño  MRN: [id number]  DATE: 2/22/2022    Chief Complaint:     Subjective:     History of Present Illness:     Referred for cachexia evaluation    Her baseline weight is 115 lb    August 2021, she weighed 103 lb    February 2022, weight 88 lb    Outside records reviewed    CT chest with contrast February 2, 2022 - no suspicious mass or lymphadenopathy.  Left pacemaker, prior sternotomy, mitral valve prosthesis.  Multichamber cardiac enlargement.    CT abdomen/pelvis with contrast February 2, 2022-few punctate renal stones and subcentimeter hypodensities, likely simple cysts.  Question gastric mucosal thickening/atrophy.  No focal mass or surrounding inflammation.  No bowel obstruction.  No suspicious mass or lymphadenopathy.  Unremarkable liver, spleen and pancreas.    CBC January 24, 2022-hemoglobin 8.1, WBC 3.2, MCV 94, platelets 171, glucose 122, creatinine 1.1, albumin 4.4, bilirubin 1.6, AST 44, ALT 10, TSH 0.8, LDH 12 173, ferritin 51      Past Medical, Surgical, Family and Social History Reviewed.    Medications and Allergies reviewed      Review of Systems   Constitutional: Positive for fatigue and unexpected weight change. Negative for fever.       Objective:     Vitals:    02/22/22 0850   BP: 108/60   BP Location: Left arm   Patient Position: Sitting   BP Method: Medium (Automatic)   Pulse: 70   Resp: 20   Temp: 97.8 °F (36.6 °C)   TempSrc: Oral   SpO2: 99%   Weight: 40.3 kg (88 lb 13.5 oz)   Height: 5' 7" (1.702 m)       BMI: Body mass index is 13.92 kg/m².    Physical Exam  Vitals and nursing note reviewed.   Constitutional:       General: She is not in acute distress.  Pulmonary:      Effort: Pulmonary effort is normal.      Breath sounds: Normal breath sounds.   Neurological:      Mental Status: She is alert. Mental status is at baseline.         Assessment:       1. Anemia, chronic disease    2. Cachexia    3. Nutritional anemia, unspecified         Plan:   Normocytic anemia  -her LDH " is very elevated  -will initiate a workup for anemia  -will repeat LDH  -check retic, erythropoietin, ESR  -check SPEP, free light chain assay, immunofixation electrophoresis, TACOS    Cachexia  -CT abdomen/pelvis/chest largely unremarkable  -if anemia workup unrevealing, will consult GI for endoscopy  -check hepatitis B/C serologies, check HIV    ?Cardiac Cachexia  -if above workup negative then cardiac cachexia would be a consideration    Follow-up in 2 weeks to discuss test results    Many thanks to Dr. Stevenson Huynh for the kind referral

## 2022-02-23 LAB
ALBUMIN SERPL ELPH-MCNC: 4.55 G/DL (ref 3.35–5.55)
ALPHA1 GLOB SERPL ELPH-MCNC: 0.3 G/DL (ref 0.17–0.41)
ALPHA2 GLOB SERPL ELPH-MCNC: 0.46 G/DL (ref 0.43–0.99)
ANA SER QL IF: NORMAL
B-GLOBULIN SERPL ELPH-MCNC: 0.59 G/DL (ref 0.5–1.1)
GAMMA GLOB SERPL ELPH-MCNC: 1.01 G/DL (ref 0.67–1.58)
INTERPRETATION SERPL IFE-IMP: NORMAL
KAPPA LC SER QL IA: 2.73 MG/DL (ref 0.33–1.94)
KAPPA LC/LAMBDA SER IA: 1.55 (ref 0.26–1.65)
LAMBDA LC SER QL IA: 1.76 MG/DL (ref 0.57–2.63)
PATHOLOGIST INTERPRETATION IFE: NORMAL
PATHOLOGIST INTERPRETATION SPE: NORMAL
PROT SERPL-MCNC: 6.9 G/DL (ref 6–8.4)

## 2022-02-24 LAB
HBV CORE AB SERPL QL IA: NEGATIVE
HBV SURFACE AB SER-ACNC: NEGATIVE M[IU]/ML
HBV SURFACE AG SERPL QL IA: NEGATIVE
HEPATITIS C VIRUS (HCV) RNA DETECTION/QUANTIFICATION RT-PCR: NORMAL IU/ML
HIV 1+2 AB+HIV1 P24 AG SERPL QL IA: NEGATIVE

## 2022-02-26 LAB
EPO SERPL-ACNC: 31.9 MIU/ML (ref 2.6–18.5)
HBV DNA SERPL NAA+PROBE-ACNC: <10 IU/ML
HBV DNA SERPL NAA+PROBE-LOG IU: <1 LOG (10) IU/ML
HBV DNA SERPL QL NAA+PROBE: NOT DETECTED

## 2022-03-02 ENCOUNTER — TELEPHONE (OUTPATIENT)
Dept: HEMATOLOGY/ONCOLOGY | Facility: CLINIC | Age: 82
End: 2022-03-02
Payer: MEDICARE

## 2022-03-02 ENCOUNTER — OFFICE VISIT (OUTPATIENT)
Dept: HEMATOLOGY/ONCOLOGY | Facility: CLINIC | Age: 82
End: 2022-03-02
Payer: MEDICARE

## 2022-03-02 VITALS
RESPIRATION RATE: 19 BRPM | SYSTOLIC BLOOD PRESSURE: 121 MMHG | TEMPERATURE: 97 F | OXYGEN SATURATION: 100 % | WEIGHT: 89.5 LBS | HEIGHT: 67 IN | HEART RATE: 70 BPM | DIASTOLIC BLOOD PRESSURE: 61 MMHG | BODY MASS INDEX: 14.05 KG/M2

## 2022-03-02 DIAGNOSIS — D63.8 ANEMIA, CHRONIC DISEASE: Primary | ICD-10-CM

## 2022-03-02 DIAGNOSIS — R64 CACHEXIA: ICD-10-CM

## 2022-03-02 DIAGNOSIS — D50.8 OTHER IRON DEFICIENCY ANEMIAS: ICD-10-CM

## 2022-03-02 PROCEDURE — 3074F PR MOST RECENT SYSTOLIC BLOOD PRESSURE < 130 MM HG: ICD-10-PCS | Mod: CPTII,S$GLB,, | Performed by: INTERNAL MEDICINE

## 2022-03-02 PROCEDURE — 99215 PR OFFICE/OUTPT VISIT, EST, LEVL V, 40-54 MIN: ICD-10-PCS | Mod: S$GLB,,, | Performed by: INTERNAL MEDICINE

## 2022-03-02 PROCEDURE — 1126F PR PAIN SEVERITY QUANTIFIED, NO PAIN PRESENT: ICD-10-PCS | Mod: CPTII,S$GLB,, | Performed by: INTERNAL MEDICINE

## 2022-03-02 PROCEDURE — 3078F PR MOST RECENT DIASTOLIC BLOOD PRESSURE < 80 MM HG: ICD-10-PCS | Mod: CPTII,S$GLB,, | Performed by: INTERNAL MEDICINE

## 2022-03-02 PROCEDURE — 1157F PR ADVANCE CARE PLAN OR EQUIV PRESENT IN MEDICAL RECORD: ICD-10-PCS | Mod: CPTII,S$GLB,, | Performed by: INTERNAL MEDICINE

## 2022-03-02 PROCEDURE — 3074F SYST BP LT 130 MM HG: CPT | Mod: CPTII,S$GLB,, | Performed by: INTERNAL MEDICINE

## 2022-03-02 PROCEDURE — 1159F MED LIST DOCD IN RCRD: CPT | Mod: CPTII,S$GLB,, | Performed by: INTERNAL MEDICINE

## 2022-03-02 PROCEDURE — 3078F DIAST BP <80 MM HG: CPT | Mod: CPTII,S$GLB,, | Performed by: INTERNAL MEDICINE

## 2022-03-02 PROCEDURE — 99999 PR PBB SHADOW E&M-EST. PATIENT-LVL V: ICD-10-PCS | Mod: PBBFAC,,, | Performed by: INTERNAL MEDICINE

## 2022-03-02 PROCEDURE — 99215 OFFICE O/P EST HI 40 MIN: CPT | Mod: S$GLB,,, | Performed by: INTERNAL MEDICINE

## 2022-03-02 PROCEDURE — 1126F AMNT PAIN NOTED NONE PRSNT: CPT | Mod: CPTII,S$GLB,, | Performed by: INTERNAL MEDICINE

## 2022-03-02 PROCEDURE — 3288F PR FALLS RISK ASSESSMENT DOCUMENTED: ICD-10-PCS | Mod: CPTII,S$GLB,, | Performed by: INTERNAL MEDICINE

## 2022-03-02 PROCEDURE — 1160F RVW MEDS BY RX/DR IN RCRD: CPT | Mod: CPTII,S$GLB,, | Performed by: INTERNAL MEDICINE

## 2022-03-02 PROCEDURE — 3288F FALL RISK ASSESSMENT DOCD: CPT | Mod: CPTII,S$GLB,, | Performed by: INTERNAL MEDICINE

## 2022-03-02 PROCEDURE — 1101F PT FALLS ASSESS-DOCD LE1/YR: CPT | Mod: CPTII,S$GLB,, | Performed by: INTERNAL MEDICINE

## 2022-03-02 PROCEDURE — 99999 PR PBB SHADOW E&M-EST. PATIENT-LVL V: CPT | Mod: PBBFAC,,, | Performed by: INTERNAL MEDICINE

## 2022-03-02 PROCEDURE — 1159F PR MEDICATION LIST DOCUMENTED IN MEDICAL RECORD: ICD-10-PCS | Mod: CPTII,S$GLB,, | Performed by: INTERNAL MEDICINE

## 2022-03-02 PROCEDURE — 1160F PR REVIEW ALL MEDS BY PRESCRIBER/CLIN PHARMACIST DOCUMENTED: ICD-10-PCS | Mod: CPTII,S$GLB,, | Performed by: INTERNAL MEDICINE

## 2022-03-02 PROCEDURE — 1157F ADVNC CARE PLAN IN RCRD: CPT | Mod: CPTII,S$GLB,, | Performed by: INTERNAL MEDICINE

## 2022-03-02 PROCEDURE — 1101F PR PT FALLS ASSESS DOC 0-1 FALLS W/OUT INJ PAST YR: ICD-10-PCS | Mod: CPTII,S$GLB,, | Performed by: INTERNAL MEDICINE

## 2022-03-02 RX ORDER — FENTANYL CITRATE 50 UG/ML
100 INJECTION, SOLUTION INTRAMUSCULAR; INTRAVENOUS ONCE AS NEEDED
Status: CANCELLED | OUTPATIENT
Start: 2022-03-02 | End: 2033-07-29

## 2022-03-02 RX ORDER — LIDOCAINE HYDROCHLORIDE 10 MG/ML
1 INJECTION, SOLUTION EPIDURAL; INFILTRATION; INTRACAUDAL; PERINEURAL ONCE
Status: CANCELLED | OUTPATIENT
Start: 2022-03-02 | End: 2022-03-02

## 2022-03-02 RX ORDER — MIDAZOLAM HYDROCHLORIDE 5 MG/ML
5 INJECTION INTRAMUSCULAR; INTRAVENOUS ONCE AS NEEDED
Status: CANCELLED | OUTPATIENT
Start: 2022-03-02 | End: 2033-07-29

## 2022-03-02 RX ORDER — SODIUM CHLORIDE 9 MG/ML
INJECTION, SOLUTION INTRAVENOUS CONTINUOUS
Status: CANCELLED | OUTPATIENT
Start: 2022-03-02

## 2022-03-02 NOTE — PROGRESS NOTES
"PATIENT: Noemy Niño  MRN: [id number]  DATE: 3/2/2022    Chief Complaint:     Subjective:     History of Present Illness:     Referred for cachexia evaluation    Her baseline weight is 115 lb    August 2021, she weighed 103 lb    February 2022, weight 88 lb    Outside records reviewed    CT chest with contrast February 2, 2022 - no suspicious mass or lymphadenopathy.  Left pacemaker, prior sternotomy, mitral valve prosthesis.  Multichamber cardiac enlargement.    CT abdomen/pelvis with contrast February 2, 2022-few punctate renal stones and subcentimeter hypodensities, likely simple cysts.  Question gastric mucosal thickening/atrophy.  No focal mass or surrounding inflammation.  No bowel obstruction.  No suspicious mass or lymphadenopathy.  Unremarkable liver, spleen and pancreas.    CBC January 24, 2022-hemoglobin 8.1, WBC 3.2, MCV 94, platelets 171, glucose 122, creatinine 1.1, albumin 4.4, bilirubin 1.6, AST 44, ALT 10, TSH 0.8, ferritin 51    INTERVAL HISTORY:   -severe cachexia workup reviewed and reveals macrocytic anemia with functional iron deficiency.  Hepatitis B/C and HIV serology negative.  TACOS unremarkable.  SPEP and immunofixation negative.  Retic count and erythropoietin level only mildly elevated.  LDH grossly elevated at 1142      Past Medical, Surgical, Family and Social History Reviewed.    Medications and Allergies reviewed      Review of Systems   Constitutional: Positive for fatigue and unexpected weight change. Negative for fever.       Objective:     Vitals:    03/02/22 1418   BP: 121/61   BP Location: Left arm   Patient Position: Sitting   BP Method: Medium (Automatic)   Pulse: 70   Resp: 19   Temp: 97 °F (36.1 °C)   TempSrc: Oral   SpO2: 100%   Weight: 40.6 kg (89 lb 8.1 oz)   Height: 5' 7" (1.702 m)       BMI: Body mass index is 14.02 kg/m².    Physical Exam  Vitals and nursing note reviewed.   Constitutional:       General: She is not in acute distress.  Pulmonary:      Effort: Pulmonary " effort is normal.      Breath sounds: Normal breath sounds.   Neurological:      Mental Status: She is alert. Mental status is at baseline.         Assessment:       1. Anemia, chronic disease    2. Cachexia    3. Other iron deficiency anemias        Plan:   Macrocytic anemia  -her LDH is very elevated  -HIV, hepatitis-B, hepatitis-C, TACOS, SPEP, immunofixation negative  -retic and erythropoietin level only mildly elevated  -iron levels show functional iron deficiency with low iron saturation  -recommend bone marrow biopsy for further evaluation, described procedure in detail, discussed indications, benefits, potential side effects and alternatives.  Consent for bone marrow biopsy sign.  Will schedule on 03/24/2022 under conscious sedation with fentanyl and Versed.  Hold Coumadin 5 days before bone marrow biopsy and restart the day after.  Follow-up in 2 weeks after bone marrow biopsy to discuss test results    Cachexia  -CT abdomen/pelvis/chest largely unremarkable  -consult GI for endoscopy, she sees Dr. Kenney, asked her to follow-up with him    ?? Cardiac Cachexia  -if above workup negative then cardiac cachexia would be a consideration    Follow-up in 2 weeks to discuss test results    Many thanks to Dr. Stevenson Huynh for the kind referral

## 2022-03-02 NOTE — H&P (VIEW-ONLY)
"PATIENT: Noemy Niño  MRN: [id number]  DATE: 3/2/2022    Chief Complaint:     Subjective:     History of Present Illness:     Referred for cachexia evaluation    Her baseline weight is 115 lb    August 2021, she weighed 103 lb    February 2022, weight 88 lb    Outside records reviewed    CT chest with contrast February 2, 2022 - no suspicious mass or lymphadenopathy.  Left pacemaker, prior sternotomy, mitral valve prosthesis.  Multichamber cardiac enlargement.    CT abdomen/pelvis with contrast February 2, 2022-few punctate renal stones and subcentimeter hypodensities, likely simple cysts.  Question gastric mucosal thickening/atrophy.  No focal mass or surrounding inflammation.  No bowel obstruction.  No suspicious mass or lymphadenopathy.  Unremarkable liver, spleen and pancreas.    CBC January 24, 2022-hemoglobin 8.1, WBC 3.2, MCV 94, platelets 171, glucose 122, creatinine 1.1, albumin 4.4, bilirubin 1.6, AST 44, ALT 10, TSH 0.8, ferritin 51    INTERVAL HISTORY:   -severe cachexia workup reviewed and reveals macrocytic anemia with functional iron deficiency.  Hepatitis B/C and HIV serology negative.  TACOS unremarkable.  SPEP and immunofixation negative.  Retic count and erythropoietin level only mildly elevated.  LDH grossly elevated at 1142      Past Medical, Surgical, Family and Social History Reviewed.    Medications and Allergies reviewed      Review of Systems   Constitutional: Positive for fatigue and unexpected weight change. Negative for fever.       Objective:     Vitals:    03/02/22 1418   BP: 121/61   BP Location: Left arm   Patient Position: Sitting   BP Method: Medium (Automatic)   Pulse: 70   Resp: 19   Temp: 97 °F (36.1 °C)   TempSrc: Oral   SpO2: 100%   Weight: 40.6 kg (89 lb 8.1 oz)   Height: 5' 7" (1.702 m)       BMI: Body mass index is 14.02 kg/m².    Physical Exam  Vitals and nursing note reviewed.   Constitutional:       General: She is not in acute distress.  Pulmonary:      Effort: Pulmonary " effort is normal.      Breath sounds: Normal breath sounds.   Neurological:      Mental Status: She is alert. Mental status is at baseline.         Assessment:       1. Anemia, chronic disease    2. Cachexia    3. Other iron deficiency anemias        Plan:   Macrocytic anemia  -her LDH is very elevated  -HIV, hepatitis-B, hepatitis-C, TACOS, SPEP, immunofixation negative  -retic and erythropoietin level only mildly elevated  -iron levels show functional iron deficiency with low iron saturation  -recommend bone marrow biopsy for further evaluation, described procedure in detail, discussed indications, benefits, potential side effects and alternatives.  Consent for bone marrow biopsy sign.  Will schedule on 03/24/2022 under conscious sedation with fentanyl and Versed.  Hold Coumadin 5 days before bone marrow biopsy and restart the day after.  Follow-up in 2 weeks after bone marrow biopsy to discuss test results    Cachexia  -CT abdomen/pelvis/chest largely unremarkable  -consult GI for endoscopy, she sees Dr. Kenney, asked her to follow-up with him    ?? Cardiac Cachexia  -if above workup negative then cardiac cachexia would be a consideration    Follow-up in 2 weeks to discuss test results    Many thanks to Dr. Stevenson Huynh for the kind referral

## 2022-03-11 ENCOUNTER — TELEPHONE (OUTPATIENT)
Dept: HEMATOLOGY/ONCOLOGY | Facility: CLINIC | Age: 82
End: 2022-03-11
Payer: MEDICARE

## 2022-03-15 ENCOUNTER — TELEPHONE (OUTPATIENT)
Dept: FAMILY MEDICINE | Facility: CLINIC | Age: 82
End: 2022-03-15
Payer: MEDICARE

## 2022-03-21 ENCOUNTER — TELEPHONE (OUTPATIENT)
Dept: HEMATOLOGY/ONCOLOGY | Facility: CLINIC | Age: 82
End: 2022-03-21
Payer: MEDICARE

## 2022-03-21 NOTE — TELEPHONE ENCOUNTER
Notified the patient covid swab for pre procedure recently changed. She will not need a swab before bone marrow biopsy because she is fully vaccinated. Reminded patient that OPS will call day before procedure with instructions.

## 2022-03-21 NOTE — TELEPHONE ENCOUNTER
----- Message from Carolin Moncada sent at 3/21/2022  8:28 AM CDT -----  Type:  Needs Medical Advice    Who Called: patient  Would the patient rather a call back or a response via [facility]? call  Best Call Back Number: [phone]  Additional Information: She is scheduled for a procedure on Thursday; she showed up for covid test this morning and told she did not need one.  Please advise.

## 2022-03-24 ENCOUNTER — HOSPITAL ENCOUNTER (OUTPATIENT)
Facility: HOSPITAL | Age: 82
Discharge: HOME | End: 2022-03-24
Attending: INTERNAL MEDICINE | Admitting: INTERNAL MEDICINE
Payer: MEDICARE

## 2022-03-24 VITALS
HEIGHT: 67 IN | HEART RATE: 70 BPM | DIASTOLIC BLOOD PRESSURE: 54 MMHG | BODY MASS INDEX: 13.97 KG/M2 | WEIGHT: 89 LBS | SYSTOLIC BLOOD PRESSURE: 116 MMHG | OXYGEN SATURATION: 95 % | RESPIRATION RATE: 16 BRPM | TEMPERATURE: 98 F

## 2022-03-24 DIAGNOSIS — D63.8 ANEMIA, CHRONIC DISEASE: ICD-10-CM

## 2022-03-24 LAB
ALBUMIN SERPL BCP-MCNC: 4.2 G/DL (ref 3.5–5.2)
ALP SERPL-CCNC: 48 U/L (ref 55–135)
ALT SERPL W/O P-5'-P-CCNC: 10 U/L (ref 10–44)
ANION GAP SERPL CALC-SCNC: 8 MMOL/L (ref 8–16)
AST SERPL-CCNC: 40 U/L (ref 10–40)
BASOPHILS # BLD AUTO: 0.02 K/UL (ref 0–0.2)
BASOPHILS NFR BLD: 0.5 % (ref 0–1.9)
BILIRUB SERPL-MCNC: 1.9 MG/DL (ref 0.1–1)
BUN SERPL-MCNC: 15 MG/DL (ref 8–23)
CALCIUM SERPL-MCNC: 9.4 MG/DL (ref 8.7–10.5)
CHLORIDE SERPL-SCNC: 102 MMOL/L (ref 95–110)
CO2 SERPL-SCNC: 29 MMOL/L (ref 23–29)
CREAT SERPL-MCNC: 1.2 MG/DL (ref 0.5–1.4)
DIFFERENTIAL METHOD: ABNORMAL
EOSINOPHIL # BLD AUTO: 0.1 K/UL (ref 0–0.5)
EOSINOPHIL NFR BLD: 1.8 % (ref 0–8)
ERYTHROCYTE [DISTWIDTH] IN BLOOD BY AUTOMATED COUNT: 15.9 % (ref 11.5–14.5)
EST. GFR  (AFRICAN AMERICAN): 50 ML/MIN/1.73 M^2
EST. GFR  (NON AFRICAN AMERICAN): 43 ML/MIN/1.73 M^2
GLUCOSE SERPL-MCNC: 114 MG/DL (ref 70–110)
HCT VFR BLD AUTO: 24.3 % (ref 37–48.5)
HGB BLD-MCNC: 7.5 G/DL (ref 12–16)
IMM GRANULOCYTES # BLD AUTO: 0.01 K/UL (ref 0–0.04)
IMM GRANULOCYTES NFR BLD AUTO: 0.3 % (ref 0–0.5)
LDH SERPL L TO P-CCNC: 1001 U/L (ref 110–260)
LYMPHOCYTES # BLD AUTO: 0.6 K/UL (ref 1–4.8)
LYMPHOCYTES NFR BLD: 15.1 % (ref 18–48)
MCH RBC QN AUTO: 30.6 PG (ref 27–31)
MCHC RBC AUTO-ENTMCNC: 30.9 G/DL (ref 32–36)
MCV RBC AUTO: 99 FL (ref 82–98)
MONOCYTES # BLD AUTO: 0.6 K/UL (ref 0.3–1)
MONOCYTES NFR BLD: 14.4 % (ref 4–15)
NEUTROPHILS # BLD AUTO: 2.7 K/UL (ref 1.8–7.7)
NEUTROPHILS NFR BLD: 67.9 % (ref 38–73)
NRBC BLD-RTO: 0 /100 WBC
PLATELET # BLD AUTO: 108 K/UL (ref 150–450)
PMV BLD AUTO: ABNORMAL FL (ref 9.2–12.9)
POTASSIUM SERPL-SCNC: 4.2 MMOL/L (ref 3.5–5.1)
PROT SERPL-MCNC: 6.5 G/DL (ref 6–8.4)
RBC # BLD AUTO: 2.45 M/UL (ref 4–5.4)
SODIUM SERPL-SCNC: 139 MMOL/L (ref 136–145)
URATE SERPL-MCNC: 4.4 MG/DL (ref 2.4–5.7)
WBC # BLD AUTO: 3.9 K/UL (ref 3.9–12.7)

## 2022-03-24 PROCEDURE — 88341 PR IHC OR ICC EACH ADD'L SINGLE ANTIBODY  STAINPR: ICD-10-PCS | Mod: 26,,, | Performed by: PATHOLOGY

## 2022-03-24 PROCEDURE — 38222 DX BONE MARROW BX & ASPIR: CPT | Mod: LT,,, | Performed by: INTERNAL MEDICINE

## 2022-03-24 PROCEDURE — 88311 DECALCIFY TISSUE: CPT | Performed by: PATHOLOGY

## 2022-03-24 PROCEDURE — 88185 FLOWCYTOMETRY/TC ADD-ON: CPT | Performed by: PATHOLOGY

## 2022-03-24 PROCEDURE — 36415 COLL VENOUS BLD VENIPUNCTURE: CPT | Performed by: INTERNAL MEDICINE

## 2022-03-24 PROCEDURE — 88264 CHROMOSOME ANALYSIS 20-25: CPT | Performed by: INTERNAL MEDICINE

## 2022-03-24 PROCEDURE — 88311 PR  DECALCIFY TISSUE: ICD-10-PCS | Mod: 26,,, | Performed by: PATHOLOGY

## 2022-03-24 PROCEDURE — 88313 SPECIAL STAINS GROUP 2: CPT | Mod: 26,,, | Performed by: PATHOLOGY

## 2022-03-24 PROCEDURE — 88184 FLOWCYTOMETRY/ TC 1 MARKER: CPT | Performed by: PATHOLOGY

## 2022-03-24 PROCEDURE — 88189 FLOWCYTOMETRY/READ 16 & >: CPT | Mod: ,,, | Performed by: PATHOLOGY

## 2022-03-24 PROCEDURE — 85097 PR  BONE MARROW,SMEAR INTERPRETATION: ICD-10-PCS | Mod: ,,, | Performed by: PATHOLOGY

## 2022-03-24 PROCEDURE — 85097 BONE MARROW INTERPRETATION: CPT | Mod: ,,, | Performed by: PATHOLOGY

## 2022-03-24 PROCEDURE — 88342 IMHCHEM/IMCYTCHM 1ST ANTB: CPT | Mod: 26,59,, | Performed by: PATHOLOGY

## 2022-03-24 PROCEDURE — 83615 LACTATE (LD) (LDH) ENZYME: CPT | Performed by: INTERNAL MEDICINE

## 2022-03-24 PROCEDURE — 88313 SPECIAL STAINS GROUP 2: CPT | Mod: 59 | Performed by: PATHOLOGY

## 2022-03-24 PROCEDURE — 85025 COMPLETE CBC W/AUTO DIFF WBC: CPT | Performed by: INTERNAL MEDICINE

## 2022-03-24 PROCEDURE — 63600175 PHARM REV CODE 636 W HCPCS: Performed by: INTERNAL MEDICINE

## 2022-03-24 PROCEDURE — 88299 UNLISTED CYTOGENETIC STUDY: CPT | Performed by: INTERNAL MEDICINE

## 2022-03-24 PROCEDURE — 80053 COMPREHEN METABOLIC PANEL: CPT | Performed by: INTERNAL MEDICINE

## 2022-03-24 PROCEDURE — 88311 DECALCIFY TISSUE: CPT | Mod: 26,,, | Performed by: PATHOLOGY

## 2022-03-24 PROCEDURE — 88237 TISSUE CULTURE BONE MARROW: CPT | Performed by: INTERNAL MEDICINE

## 2022-03-24 PROCEDURE — 84550 ASSAY OF BLOOD/URIC ACID: CPT | Performed by: INTERNAL MEDICINE

## 2022-03-24 PROCEDURE — 38222 PR BONE MARROW BIOPSY(IES) W/ASPIRATION(S); DIAGNOSTIC: ICD-10-PCS | Mod: LT,,, | Performed by: INTERNAL MEDICINE

## 2022-03-24 PROCEDURE — 88271 CYTOGENETICS DNA PROBE: CPT | Mod: 59 | Performed by: INTERNAL MEDICINE

## 2022-03-24 PROCEDURE — 38221 DX BONE MARROW BIOPSIES: CPT | Performed by: INTERNAL MEDICINE

## 2022-03-24 PROCEDURE — 88341 IMHCHEM/IMCYTCHM EA ADD ANTB: CPT | Mod: 26,,, | Performed by: PATHOLOGY

## 2022-03-24 PROCEDURE — 88342 IMHCHEM/IMCYTCHM 1ST ANTB: CPT | Performed by: PATHOLOGY

## 2022-03-24 PROCEDURE — 88313 PR  SPECIAL STAINS,GROUP II: ICD-10-PCS | Mod: 26,,, | Performed by: PATHOLOGY

## 2022-03-24 PROCEDURE — 88342 CHG IMMUNOCYTOCHEMISTRY: ICD-10-PCS | Mod: 26,59,, | Performed by: PATHOLOGY

## 2022-03-24 PROCEDURE — 88305 TISSUE EXAM BY PATHOLOGIST: CPT | Performed by: PATHOLOGY

## 2022-03-24 PROCEDURE — 88341 IMHCHEM/IMCYTCHM EA ADD ANTB: CPT | Performed by: PATHOLOGY

## 2022-03-24 PROCEDURE — 88305 TISSUE EXAM BY PATHOLOGIST: ICD-10-PCS | Mod: 26,,, | Performed by: PATHOLOGY

## 2022-03-24 PROCEDURE — 38222 DX BONE MARROW BX & ASPIR: CPT | Performed by: INTERNAL MEDICINE

## 2022-03-24 PROCEDURE — 88305 TISSUE EXAM BY PATHOLOGIST: CPT | Mod: 26,,, | Performed by: PATHOLOGY

## 2022-03-24 PROCEDURE — 88189 PR  FLOWCYTOMETRY/READ, 16 & > MARKERS: ICD-10-PCS | Mod: ,,, | Performed by: PATHOLOGY

## 2022-03-24 RX ORDER — LIDOCAINE HYDROCHLORIDE 10 MG/ML
1 INJECTION, SOLUTION EPIDURAL; INFILTRATION; INTRACAUDAL; PERINEURAL ONCE
Status: DISCONTINUED | OUTPATIENT
Start: 2022-03-24 | End: 2022-03-24 | Stop reason: HOSPADM

## 2022-03-24 RX ORDER — SODIUM CHLORIDE 9 MG/ML
INJECTION, SOLUTION INTRAVENOUS CONTINUOUS
Status: DISCONTINUED | OUTPATIENT
Start: 2022-03-24 | End: 2022-03-24 | Stop reason: HOSPADM

## 2022-03-24 RX ORDER — FENTANYL CITRATE 50 UG/ML
100 INJECTION, SOLUTION INTRAMUSCULAR; INTRAVENOUS ONCE AS NEEDED
Status: COMPLETED | OUTPATIENT
Start: 2022-03-24 | End: 2022-03-24

## 2022-03-24 RX ORDER — MIDAZOLAM HYDROCHLORIDE 5 MG/ML
5 INJECTION INTRAMUSCULAR; INTRAVENOUS ONCE AS NEEDED
Status: COMPLETED | OUTPATIENT
Start: 2022-03-24 | End: 2022-03-24

## 2022-03-24 RX ADMIN — MIDAZOLAM HYDROCHLORIDE 1 MG: 5 INJECTION, SOLUTION INTRAMUSCULAR; INTRAVENOUS at 08:03

## 2022-03-24 RX ADMIN — FENTANYL CITRATE 20 MCG: 50 INJECTION INTRAMUSCULAR; INTRAVENOUS at 08:03

## 2022-03-24 RX ADMIN — FENTANYL CITRATE 10 MCG: 50 INJECTION INTRAMUSCULAR; INTRAVENOUS at 08:03

## 2022-03-24 NOTE — PROCEDURES
"Noemy Niño is a 81 y.o. female patient.    Temp: 98.1 °F (36.7 °C) (03/24/22 0724)  Pulse: 70 (03/24/22 0854)  Resp: 18 (03/24/22 0724)  BP: (!) 114/56 (03/24/22 0854)  SpO2: 99 % (03/24/22 0854)  Weight: 40.4 kg (89 lb) (03/24/22 0724)  Height: 5' 7" (170.2 cm) (03/24/22 0724)  Mallampati Scale: Class II  ASA Classification: Class 2    Procedures    Pre-Op Diagnosis: anemia    Post-Op Diagnosis: anemia    Estimated Blood Loss: 2 cc    Informed consent obtained from patient. Time-out done. Patient was then placed in right lateral position. Conscious sedation was administered by me using a combination of Versed and Fentanyl. The procedure started at approximately 8 30 a.m. and was completed by 8 50 a.m. The patient was monitored (heart rate, blood pressure, respirations, oxygen saturation, heart rhythm) throughout the procedure by a trained nurse.    Under strict aseptic precautions Left posterior iliac crest was prepped and draped in a sterile fashion. 1% lidocaine was used for infiltration of the periosteum. Using Jamshidi needle, under aseptic precautions bone marrow aspiration was performed. The needle was then removed. Hemostasis was achieved. Specimen was examined and felt to be adequate. It was sent for appropriate studies.     Under aseptic precautions, the same Jamshidi needle was then advanced through the previous entrance site but the periosteum was entered at a slightly different location than the aspirate location. A core biopsy was obtained. The needle was then removed and hemostasis was achieved. Core biopsy specimen was examined and felt to be adequate. It was sent off for appropriate studies.     Patient tolerated the procedure well. No immediate post procedure complications were noted. The patient will continue to be monitored by a trained nurse until the effect of fentanyl and versed wears off and will be discharged appropriately.    3/24/2022  "

## 2022-03-24 NOTE — PLAN OF CARE
Pt and son given AVS and educated on discharge instructions.  Verbalized understanding of teaching.  No questions or concerns at this time.  VSS.  Pt remained supine for 30 minutes after procedure.  bandaid to L hip CDI.  Denies pain or n/v.  Tolerating clear liquids. IV removed.  Discharged to home

## 2022-03-24 NOTE — DISCHARGE SUMMARY
[facility] ([facility])  Hematology/Oncology  Discharge Summary      Patient Name: Noemy Nñio  MRN: [id number]  Admission Date: 3/24/2022  Hospital Length of Stay: 0 days  Discharge Date and Time:  03/24/2022 9:01 AM  Attending Physician: Royal García MD   Discharging Provider: Royal García MD  Primary Care Provider: Ghanshyam Dean    HPI: Pt underwent bone marrow aspiration followed by bone marrow biopsy under conscious sedation with fentanyl and versed and tolerated the procedure well without complications.    Procedure(s) (LRB):  BIOPSY-BONE MARROW (Right)     Hospital Course: Pt underwent bone marrow aspiration followed by bone marrow biopsy under conscious sedation with fentanyl and versed and tolerated the procedure well without complications.    Goals of Care Treatment Preferences:  Code Status: Full Code    Living Will: Yes            Pending Diagnostic Studies:     Procedure Component Value Units Date/Time    Chromosome Analysis, Bone Marrow [[id number]] Collected: 03/24/22 0847    Order Status: Sent Lab Status: In process Updated: 03/24/22 0848    Specimen: Bone Marrow     Heme Disorders DNA/RNA Hold, Bone Marrow [[id number]] Collected: 03/24/22 0848    Order Status: Sent Lab Status: In process Updated: 03/24/22 0848    Specimen: Bone Marrow     Leukemia/Lymphoma Screen - Bone Marrow Left Posterior Iliac Crest [[id number]] Collected: 03/24/22 0847    Order Status: Sent Lab Status: In process Updated: 03/24/22 0848    Specimen: Bone Marrow     Myelodysplastic Syndrome (MDS), FISH, Bone Marrow [[id number]] Collected: 03/24/22 0847    Order Status: Sent Lab Status: In process Updated: 03/24/22 0848    Specimen: Bone Marrow     Specimen to Pathology, Bone Marrow Aspiration/Biopsy [[id number]] Collected: 03/24/22 0847    Order Status: Sent Lab Status: In process Updated: 03/24/22 0848        There are no hospital problems to display for this patient.     Discharged Condition: stable    Disposition:  Home or Self Care    Follow Up: 2 weeks    Patient Instructions:   Provided to pt    Medications:  Reconciled Home Medications:      Medication List      ASK your doctor about these medications    albuterol sulfate 90 mcg/actuation inhaler  Commonly known as: PROAIR RESPICLICK  2 puffs.     baclofen 10 MG tablet  Commonly known as: LIORESAL  Take 10 mg by mouth as needed.     cholecalciferol (vitamin D3) 50 mcg (2,000 unit) Cap  Commonly known as: VITAMIN D3  Take 2,000 Int'l Units by mouth.     COUMADIN 5 MG tablet  Generic drug: warfarin     cyanocobalamin (vitamin B-12) 5,000 mcg Cap  Take by mouth.     folic acid 1 MG tablet  Commonly known as: FOLVITE  Take 1,000 mcg by mouth once daily.     furosemide 40 MG tablet  Commonly known as: LASIX  Take 40 mg by mouth.     levothyroxine 75 MCG tablet  Commonly known as: SYNTHROID  Take 75 mcg by mouth once daily.     loratadine 10 mg tablet  Commonly known as: CLARITIN  Take 10 mg by mouth daily as needed for Allergies.     megestroL 40 MG Tab  Commonly known as: MEGACE  Take 40 mg by mouth 3 (three) times daily.     metoprolol tartrate 50 MG tablet  Commonly known as: LOPRESSOR  Take 50 mg by mouth.     mirtazapine 15 MG disintegrating tablet  Commonly known as: REMERON SOL-TAB  Take 15 mg by mouth every evening. 1/2 tablet     pravastatin 20 MG tablet  Commonly known as: PRAVACHOL     sertraline 50 MG tablet  Commonly known as: ZOLOFT     sodium chloride 0.65 % nasal spray  Commonly known as: OCEAN  2 sprays by Nasal route as needed.            Royal García MD  Hematology/Oncology  [facility])

## 2022-03-25 LAB
BODY SITE - BONE MARROW: NORMAL
CLINICAL DIAGNOSIS - BONE MARROW: NORMAL
FLOW CYTOMETRY ANTIBODIES ANALYZED - BONE MARROW: NORMAL
FLOW CYTOMETRY COMMENT - BONE MARROW: NORMAL
FLOW CYTOMETRY INTERPRETATION - BONE MARROW: NORMAL

## 2022-03-28 LAB
DNA/RNA EXTRACT AND HOLD RESULT: NORMAL
DNA/RNA EXTRACTION: NORMAL
EXHR SPECIMEN TYPE: NORMAL

## 2022-04-01 LAB
CHROM BANDING METHOD: NORMAL
CHROMOSOME ANALYSIS BM ADDITIONAL INFORMATION: NORMAL
CHROMOSOME ANALYSIS BM RELEASED BY: NORMAL
CHROMOSOME ANALYSIS BM RESULT SUMMARY: NORMAL
CLINICAL CYTOGENETICIST REVIEW: NORMAL
CLINICAL CYTOGENETICIST REVIEW: NORMAL
FINAL PATHOLOGIC DIAGNOSIS: NORMAL
FMDS SPECIMEN: NORMAL
GROSS: NORMAL
KARYOTYP MAR: NORMAL
Lab: NORMAL
MDS FISH ADDITIONAL INFORMATION: NORMAL
MDS FISH DISCLAIMER: NORMAL
MDS FISH REASON FOR REFERRAL (BM): NORMAL
MDS FISH RELEASED BY: NORMAL
MDS FISH RESULT (BM): NORMAL
MDS FISH RESULT SUMMARY: NORMAL
MDS FISH RESULT TABLE: NORMAL
MICROSCOPIC EXAM: NORMAL
REASON FOR REFERRAL (NARRATIVE): NORMAL
REF LAB TEST METHOD: NORMAL
REF LAB TEST METHOD: NORMAL
SPECIMEN SOURCE: NORMAL
SPECIMEN SOURCE: NORMAL
SPECIMEN: NORMAL
SUPPLEMENTAL DIAGNOSIS: NORMAL

## 2022-04-07 ENCOUNTER — OFFICE VISIT (OUTPATIENT)
Dept: HEMATOLOGY/ONCOLOGY | Facility: CLINIC | Age: 82
End: 2022-04-07
Payer: MEDICARE

## 2022-04-07 VITALS
RESPIRATION RATE: 18 BRPM | DIASTOLIC BLOOD PRESSURE: 75 MMHG | HEIGHT: 67 IN | SYSTOLIC BLOOD PRESSURE: 156 MMHG | WEIGHT: 90.38 LBS | OXYGEN SATURATION: 98 % | HEART RATE: 78 BPM | BODY MASS INDEX: 14.19 KG/M2 | TEMPERATURE: 98 F

## 2022-04-07 DIAGNOSIS — R64 CACHEXIA: ICD-10-CM

## 2022-04-07 DIAGNOSIS — D63.8 ANEMIA, CHRONIC DISEASE: Primary | ICD-10-CM

## 2022-04-07 DIAGNOSIS — D53.9 NUTRITIONAL ANEMIA, UNSPECIFIED: ICD-10-CM

## 2022-04-07 DIAGNOSIS — D63.1 ANEMIA ASSOCIATED WITH CHRONIC RENAL FAILURE: ICD-10-CM

## 2022-04-07 DIAGNOSIS — N18.9 ANEMIA ASSOCIATED WITH CHRONIC RENAL FAILURE: ICD-10-CM

## 2022-04-07 PROCEDURE — 1160F RVW MEDS BY RX/DR IN RCRD: CPT | Mod: CPTII,S$GLB,, | Performed by: INTERNAL MEDICINE

## 2022-04-07 PROCEDURE — 1159F PR MEDICATION LIST DOCUMENTED IN MEDICAL RECORD: ICD-10-PCS | Mod: CPTII,S$GLB,, | Performed by: INTERNAL MEDICINE

## 2022-04-07 PROCEDURE — 3288F FALL RISK ASSESSMENT DOCD: CPT | Mod: CPTII,S$GLB,, | Performed by: INTERNAL MEDICINE

## 2022-04-07 PROCEDURE — 3078F DIAST BP <80 MM HG: CPT | Mod: CPTII,S$GLB,, | Performed by: INTERNAL MEDICINE

## 2022-04-07 PROCEDURE — 1126F PR PAIN SEVERITY QUANTIFIED, NO PAIN PRESENT: ICD-10-PCS | Mod: CPTII,S$GLB,, | Performed by: INTERNAL MEDICINE

## 2022-04-07 PROCEDURE — 3077F SYST BP >= 140 MM HG: CPT | Mod: CPTII,S$GLB,, | Performed by: INTERNAL MEDICINE

## 2022-04-07 PROCEDURE — 99215 PR OFFICE/OUTPT VISIT, EST, LEVL V, 40-54 MIN: ICD-10-PCS | Mod: S$GLB,,, | Performed by: INTERNAL MEDICINE

## 2022-04-07 PROCEDURE — 1157F PR ADVANCE CARE PLAN OR EQUIV PRESENT IN MEDICAL RECORD: ICD-10-PCS | Mod: CPTII,S$GLB,, | Performed by: INTERNAL MEDICINE

## 2022-04-07 PROCEDURE — 1160F PR REVIEW ALL MEDS BY PRESCRIBER/CLIN PHARMACIST DOCUMENTED: ICD-10-PCS | Mod: CPTII,S$GLB,, | Performed by: INTERNAL MEDICINE

## 2022-04-07 PROCEDURE — 99999 PR PBB SHADOW E&M-EST. PATIENT-LVL III: CPT | Mod: PBBFAC,,, | Performed by: INTERNAL MEDICINE

## 2022-04-07 PROCEDURE — 3078F PR MOST RECENT DIASTOLIC BLOOD PRESSURE < 80 MM HG: ICD-10-PCS | Mod: CPTII,S$GLB,, | Performed by: INTERNAL MEDICINE

## 2022-04-07 PROCEDURE — 3077F PR MOST RECENT SYSTOLIC BLOOD PRESSURE >= 140 MM HG: ICD-10-PCS | Mod: CPTII,S$GLB,, | Performed by: INTERNAL MEDICINE

## 2022-04-07 PROCEDURE — 1157F ADVNC CARE PLAN IN RCRD: CPT | Mod: CPTII,S$GLB,, | Performed by: INTERNAL MEDICINE

## 2022-04-07 PROCEDURE — 1101F PR PT FALLS ASSESS DOC 0-1 FALLS W/OUT INJ PAST YR: ICD-10-PCS | Mod: CPTII,S$GLB,, | Performed by: INTERNAL MEDICINE

## 2022-04-07 PROCEDURE — 1101F PT FALLS ASSESS-DOCD LE1/YR: CPT | Mod: CPTII,S$GLB,, | Performed by: INTERNAL MEDICINE

## 2022-04-07 PROCEDURE — 99215 OFFICE O/P EST HI 40 MIN: CPT | Mod: S$GLB,,, | Performed by: INTERNAL MEDICINE

## 2022-04-07 PROCEDURE — 99999 PR PBB SHADOW E&M-EST. PATIENT-LVL III: ICD-10-PCS | Mod: PBBFAC,,, | Performed by: INTERNAL MEDICINE

## 2022-04-07 PROCEDURE — 1159F MED LIST DOCD IN RCRD: CPT | Mod: CPTII,S$GLB,, | Performed by: INTERNAL MEDICINE

## 2022-04-07 PROCEDURE — 1126F AMNT PAIN NOTED NONE PRSNT: CPT | Mod: CPTII,S$GLB,, | Performed by: INTERNAL MEDICINE

## 2022-04-07 PROCEDURE — 3288F PR FALLS RISK ASSESSMENT DOCUMENTED: ICD-10-PCS | Mod: CPTII,S$GLB,, | Performed by: INTERNAL MEDICINE

## 2022-04-07 RX ORDER — HEPARIN 100 UNIT/ML
500 SYRINGE INTRAVENOUS
Status: CANCELLED | OUTPATIENT
Start: 2022-04-22

## 2022-04-07 RX ORDER — HEPARIN 100 UNIT/ML
500 SYRINGE INTRAVENOUS
Status: CANCELLED | OUTPATIENT
Start: 2022-05-06

## 2022-04-07 RX ORDER — HEPARIN 100 UNIT/ML
500 SYRINGE INTRAVENOUS
Status: CANCELLED | OUTPATIENT
Start: 2022-05-20

## 2022-04-07 RX ORDER — SODIUM CHLORIDE 0.9 % (FLUSH) 0.9 %
10 SYRINGE (ML) INJECTION
Status: CANCELLED | OUTPATIENT
Start: 2022-04-22

## 2022-04-07 RX ORDER — HEPARIN 100 UNIT/ML
500 SYRINGE INTRAVENOUS
Status: CANCELLED | OUTPATIENT
Start: 2022-06-03

## 2022-04-07 RX ORDER — SODIUM CHLORIDE 0.9 % (FLUSH) 0.9 %
10 SYRINGE (ML) INJECTION
Status: CANCELLED | OUTPATIENT
Start: 2022-06-03

## 2022-04-07 RX ORDER — SODIUM CHLORIDE 0.9 % (FLUSH) 0.9 %
10 SYRINGE (ML) INJECTION
Status: CANCELLED | OUTPATIENT
Start: 2022-05-06

## 2022-04-07 RX ORDER — SODIUM CHLORIDE 0.9 % (FLUSH) 0.9 %
10 SYRINGE (ML) INJECTION
Status: CANCELLED | OUTPATIENT
Start: 2022-05-20

## 2022-04-07 NOTE — PROGRESS NOTES
"PATIENT: Noemy Niño  MRN: [id number]  DATE: 4/7/2022    Chief Complaint: anemia    Subjective:     History of Present Illness:     PCP -Dr.Haresh Rodriguez    Referred for cachexia evaluation    Her baseline weight is 115 lb    August 2021, she weighed 103 lb    February 2022, weight 88 lb    Outside records reviewed    CT chest with contrast February 2, 2022 - no suspicious mass or lymphadenopathy.  Left pacemaker, prior sternotomy, mitral valve prosthesis.  Multichamber cardiac enlargement.    CT abdomen/pelvis with contrast February 2, 2022-few punctate renal stones and subcentimeter hypodensities, likely simple cysts.  Question gastric mucosal thickening/atrophy.  No focal mass or surrounding inflammation.  No bowel obstruction.  No suspicious mass or lymphadenopathy.  Unremarkable liver, spleen and pancreas.    CBC January 24, 2022-hemoglobin 8.1, WBC 3.2, MCV 94, platelets 171, glucose 122, creatinine 1.1, albumin 4.4, bilirubin 1.6, AST 44, ALT 10, TSH 0.8, ferritin 51    INTERVAL HISTORY:   -severe cachexia workup reviewed and reveals macrocytic anemia with functional iron deficiency.  Hepatitis B/C and HIV serology negative.  TACOS unremarkable.  SPEP and immunofixation negative.  Retic count and erythropoietin level only mildly elevated.  LDH grossly elevated at 1142  -follows with Cardiology, Dr. Kendrick Cardenas at [facility]  -underwent bone marrow biopsy and is here to discuss test results    Past Medical, Surgical, Family and Social History Reviewed.    Medications and Allergies reviewed    Review of Systems   Constitutional: Positive for fatigue and unexpected weight change. Negative for fever.     Objective:     Vitals:    04/07/22 1008   BP: (!) 156/75   BP Location: Right arm   Patient Position: Sitting   BP Method: Medium (Automatic)   Pulse: 78   Resp: 18   Temp: 97.9 °F (36.6 °C)   TempSrc: Oral   SpO2: 98%   Weight: 41 kg (90 lb 6.2 oz)   Height: 5' 7" (1.702 m)       BMI: Body mass index is " 14.16 kg/m².    Physical Exam  Vitals and nursing note reviewed.   Constitutional:       General: She is not in acute distress.  Pulmonary:      Effort: Pulmonary effort is normal.      Breath sounds: Normal breath sounds.   Neurological:      Mental Status: She is alert. Mental status is at baseline.       Assessment:       1. Anemia, chronic disease    2. Cachexia    3. Nutritional anemia, unspecified     4. Anemia associated with chronic renal failure      Plan:   Anemia 2/2 Chronic Disease and CKD  -her LDH is very elevated  -HIV, hepatitis-B, hepatitis-C, TACOS, SPEP, immunofixation negative  -retic and erythropoietin level only mildly elevated  -bone marrow biopsy 03/24/2022, showed adequate megakaryocytes, trilineage hematopoiesis.  There is no increase in stainable iron.  Cytogenetics unremarkable.  No overt morphologic dysplasia.  Overall bone marrow biopsy unremarkable.  -her anemia is secondary to chronic disease, chronic kidney disease, pulmonary fibrosis  -recommend a combination of IV iron and retired crit  -recommend Venofer 200 mg IV piggyback every 2 weeks and Retacrit 73105 units every 2 weeks for symptomatic anemia  -plan repeat CBC, CMP, LDH, ESR, iron studies after 3 doses  -alternatively, suggested 2nd opinion Heme-Onc evaluation    Cachexia  -CT abdomen/pelvis/chest largely unremarkable  -f/u with Dr. Kenney    Pulmonary fibrosis  -follows with pulmonology    CC: Dr.Haresh Rodriguez

## 2022-04-11 ENCOUNTER — TELEPHONE (OUTPATIENT)
Dept: HEMATOLOGY/ONCOLOGY | Facility: CLINIC | Age: 82
End: 2022-04-11
Payer: MEDICARE

## 2022-04-19 ENCOUNTER — LAB VISIT (OUTPATIENT)
Dept: LAB | Facility: HOSPITAL | Age: 82
End: 2022-04-19
Attending: INTERNAL MEDICINE
Payer: MEDICARE

## 2022-04-19 DIAGNOSIS — D63.8 ANEMIA, CHRONIC DISEASE: ICD-10-CM

## 2022-04-19 DIAGNOSIS — D63.1 ANEMIA ASSOCIATED WITH CHRONIC RENAL FAILURE: ICD-10-CM

## 2022-04-19 DIAGNOSIS — N18.9 ANEMIA ASSOCIATED WITH CHRONIC RENAL FAILURE: ICD-10-CM

## 2022-04-19 LAB
ALBUMIN SERPL BCP-MCNC: 4.3 G/DL (ref 3.5–5.2)
ALP SERPL-CCNC: 57 U/L (ref 55–135)
ALT SERPL W/O P-5'-P-CCNC: 12 U/L (ref 10–44)
ANION GAP SERPL CALC-SCNC: 6 MMOL/L (ref 8–16)
ANISOCYTOSIS BLD QL SMEAR: SLIGHT
AST SERPL-CCNC: 46 U/L (ref 10–40)
BASOPHILS # BLD AUTO: 0.04 K/UL (ref 0–0.2)
BASOPHILS NFR BLD: 1 % (ref 0–1.9)
BILIRUB SERPL-MCNC: 2.1 MG/DL (ref 0.1–1)
BUN SERPL-MCNC: 13 MG/DL (ref 8–23)
CALCIUM SERPL-MCNC: 9.7 MG/DL (ref 8.7–10.5)
CHLORIDE SERPL-SCNC: 100 MMOL/L (ref 95–110)
CO2 SERPL-SCNC: 33 MMOL/L (ref 23–29)
CREAT SERPL-MCNC: 1.1 MG/DL (ref 0.5–1.4)
DIFFERENTIAL METHOD: ABNORMAL
EOSINOPHIL # BLD AUTO: 0.1 K/UL (ref 0–0.5)
EOSINOPHIL NFR BLD: 2.5 % (ref 0–8)
ERYTHROCYTE [DISTWIDTH] IN BLOOD BY AUTOMATED COUNT: 17 % (ref 11.5–14.5)
ERYTHROCYTE [SEDIMENTATION RATE] IN BLOOD BY WESTERGREN METHOD: 10 MM/HR (ref 0–20)
EST. GFR  (AFRICAN AMERICAN): 55 ML/MIN/1.73 M^2
EST. GFR  (NON AFRICAN AMERICAN): 48 ML/MIN/1.73 M^2
FERRITIN SERPL-MCNC: 26 NG/ML (ref 20–300)
GLUCOSE SERPL-MCNC: 95 MG/DL (ref 70–110)
HCT VFR BLD AUTO: 27.4 % (ref 37–48.5)
HGB BLD-MCNC: 8.3 G/DL (ref 12–16)
HYPOCHROMIA BLD QL SMEAR: ABNORMAL
IMM GRANULOCYTES # BLD AUTO: 0.01 K/UL (ref 0–0.04)
IMM GRANULOCYTES NFR BLD AUTO: 0.3 % (ref 0–0.5)
IRON SERPL-MCNC: 60 UG/DL (ref 30–160)
LDH SERPL L TO P-CCNC: 1142 U/L (ref 110–260)
LYMPHOCYTES # BLD AUTO: 0.9 K/UL (ref 1–4.8)
LYMPHOCYTES NFR BLD: 21.3 % (ref 18–48)
MCH RBC QN AUTO: 29.4 PG (ref 27–31)
MCHC RBC AUTO-ENTMCNC: 30.3 G/DL (ref 32–36)
MCV RBC AUTO: 97 FL (ref 82–98)
MONOCYTES # BLD AUTO: 0.5 K/UL (ref 0.3–1)
MONOCYTES NFR BLD: 13.5 % (ref 4–15)
NEUTROPHILS # BLD AUTO: 2.5 K/UL (ref 1.8–7.7)
NEUTROPHILS NFR BLD: 61.4 % (ref 38–73)
NRBC BLD-RTO: 0 /100 WBC
PLATELET # BLD AUTO: 158 K/UL (ref 150–450)
PLATELET BLD QL SMEAR: ABNORMAL
PMV BLD AUTO: ABNORMAL FL (ref 9.2–12.9)
POIKILOCYTOSIS BLD QL SMEAR: SLIGHT
POLYCHROMASIA BLD QL SMEAR: ABNORMAL
POTASSIUM SERPL-SCNC: 4.6 MMOL/L (ref 3.5–5.1)
PROT SERPL-MCNC: 7 G/DL (ref 6–8.4)
RBC # BLD AUTO: 2.82 M/UL (ref 4–5.4)
RETICS/RBC NFR AUTO: 2.7 % (ref 0.5–2.5)
SATURATED IRON: 14 % (ref 20–50)
SODIUM SERPL-SCNC: 139 MMOL/L (ref 136–145)
TOTAL IRON BINDING CAPACITY: 438 UG/DL (ref 250–450)
TRANSFERRIN SERPL-MCNC: 296 MG/DL (ref 200–375)
WBC # BLD AUTO: 3.99 K/UL (ref 3.9–12.7)

## 2022-04-19 PROCEDURE — 82668 ASSAY OF ERYTHROPOIETIN: CPT | Performed by: INTERNAL MEDICINE

## 2022-04-19 PROCEDURE — 85652 RBC SED RATE AUTOMATED: CPT | Performed by: INTERNAL MEDICINE

## 2022-04-19 PROCEDURE — 82728 ASSAY OF FERRITIN: CPT | Performed by: INTERNAL MEDICINE

## 2022-04-19 PROCEDURE — 85045 AUTOMATED RETICULOCYTE COUNT: CPT | Performed by: INTERNAL MEDICINE

## 2022-04-19 PROCEDURE — 80053 COMPREHEN METABOLIC PANEL: CPT | Performed by: INTERNAL MEDICINE

## 2022-04-19 PROCEDURE — 83615 LACTATE (LD) (LDH) ENZYME: CPT | Performed by: INTERNAL MEDICINE

## 2022-04-19 PROCEDURE — 85025 COMPLETE CBC W/AUTO DIFF WBC: CPT | Performed by: INTERNAL MEDICINE

## 2022-04-19 PROCEDURE — 84466 ASSAY OF TRANSFERRIN: CPT | Performed by: INTERNAL MEDICINE

## 2022-04-19 PROCEDURE — 36415 COLL VENOUS BLD VENIPUNCTURE: CPT | Performed by: INTERNAL MEDICINE

## 2022-04-21 ENCOUNTER — OFFICE VISIT (OUTPATIENT)
Dept: HEMATOLOGY/ONCOLOGY | Facility: CLINIC | Age: 82
End: 2022-04-21
Payer: MEDICARE

## 2022-04-21 VITALS
HEART RATE: 70 BPM | DIASTOLIC BLOOD PRESSURE: 62 MMHG | TEMPERATURE: 97 F | RESPIRATION RATE: 19 BRPM | OXYGEN SATURATION: 98 % | WEIGHT: 90.38 LBS | BODY MASS INDEX: 14.19 KG/M2 | HEIGHT: 67 IN | SYSTOLIC BLOOD PRESSURE: 127 MMHG

## 2022-04-21 DIAGNOSIS — D50.8 OTHER IRON DEFICIENCY ANEMIAS: ICD-10-CM

## 2022-04-21 DIAGNOSIS — D63.8 ANEMIA, CHRONIC DISEASE: Primary | ICD-10-CM

## 2022-04-21 DIAGNOSIS — R64 CACHEXIA: ICD-10-CM

## 2022-04-21 DIAGNOSIS — F41.9 ANXIETY DISORDER, UNSPECIFIED: ICD-10-CM

## 2022-04-21 DIAGNOSIS — D53.9 NUTRITIONAL ANEMIA, UNSPECIFIED: ICD-10-CM

## 2022-04-21 PROCEDURE — 1159F MED LIST DOCD IN RCRD: CPT | Mod: CPTII,S$GLB,, | Performed by: INTERNAL MEDICINE

## 2022-04-21 PROCEDURE — 99999 PR PBB SHADOW E&M-EST. PATIENT-LVL III: CPT | Mod: PBBFAC,,, | Performed by: INTERNAL MEDICINE

## 2022-04-21 PROCEDURE — 3074F SYST BP LT 130 MM HG: CPT | Mod: CPTII,S$GLB,, | Performed by: INTERNAL MEDICINE

## 2022-04-21 PROCEDURE — 3288F FALL RISK ASSESSMENT DOCD: CPT | Mod: CPTII,S$GLB,, | Performed by: INTERNAL MEDICINE

## 2022-04-21 PROCEDURE — 99999 PR PBB SHADOW E&M-EST. PATIENT-LVL III: ICD-10-PCS | Mod: PBBFAC,,, | Performed by: INTERNAL MEDICINE

## 2022-04-21 PROCEDURE — 1126F PR PAIN SEVERITY QUANTIFIED, NO PAIN PRESENT: ICD-10-PCS | Mod: CPTII,S$GLB,, | Performed by: INTERNAL MEDICINE

## 2022-04-21 PROCEDURE — 1126F AMNT PAIN NOTED NONE PRSNT: CPT | Mod: CPTII,S$GLB,, | Performed by: INTERNAL MEDICINE

## 2022-04-21 PROCEDURE — 3074F PR MOST RECENT SYSTOLIC BLOOD PRESSURE < 130 MM HG: ICD-10-PCS | Mod: CPTII,S$GLB,, | Performed by: INTERNAL MEDICINE

## 2022-04-21 PROCEDURE — 3078F PR MOST RECENT DIASTOLIC BLOOD PRESSURE < 80 MM HG: ICD-10-PCS | Mod: CPTII,S$GLB,, | Performed by: INTERNAL MEDICINE

## 2022-04-21 PROCEDURE — 3078F DIAST BP <80 MM HG: CPT | Mod: CPTII,S$GLB,, | Performed by: INTERNAL MEDICINE

## 2022-04-21 PROCEDURE — 1159F PR MEDICATION LIST DOCUMENTED IN MEDICAL RECORD: ICD-10-PCS | Mod: CPTII,S$GLB,, | Performed by: INTERNAL MEDICINE

## 2022-04-21 PROCEDURE — 1157F PR ADVANCE CARE PLAN OR EQUIV PRESENT IN MEDICAL RECORD: ICD-10-PCS | Mod: CPTII,S$GLB,, | Performed by: INTERNAL MEDICINE

## 2022-04-21 PROCEDURE — 99215 OFFICE O/P EST HI 40 MIN: CPT | Mod: S$GLB,,, | Performed by: INTERNAL MEDICINE

## 2022-04-21 PROCEDURE — 1101F PR PT FALLS ASSESS DOC 0-1 FALLS W/OUT INJ PAST YR: ICD-10-PCS | Mod: CPTII,S$GLB,, | Performed by: INTERNAL MEDICINE

## 2022-04-21 PROCEDURE — 99215 PR OFFICE/OUTPT VISIT, EST, LEVL V, 40-54 MIN: ICD-10-PCS | Mod: S$GLB,,, | Performed by: INTERNAL MEDICINE

## 2022-04-21 PROCEDURE — 3288F PR FALLS RISK ASSESSMENT DOCUMENTED: ICD-10-PCS | Mod: CPTII,S$GLB,, | Performed by: INTERNAL MEDICINE

## 2022-04-21 PROCEDURE — 1157F ADVNC CARE PLAN IN RCRD: CPT | Mod: CPTII,S$GLB,, | Performed by: INTERNAL MEDICINE

## 2022-04-21 PROCEDURE — 1160F RVW MEDS BY RX/DR IN RCRD: CPT | Mod: CPTII,S$GLB,, | Performed by: INTERNAL MEDICINE

## 2022-04-21 PROCEDURE — 1160F PR REVIEW ALL MEDS BY PRESCRIBER/CLIN PHARMACIST DOCUMENTED: ICD-10-PCS | Mod: CPTII,S$GLB,, | Performed by: INTERNAL MEDICINE

## 2022-04-21 PROCEDURE — 1101F PT FALLS ASSESS-DOCD LE1/YR: CPT | Mod: CPTII,S$GLB,, | Performed by: INTERNAL MEDICINE

## 2022-04-21 RX ORDER — OXYBUTYNIN CHLORIDE 5 MG/1
TABLET ORAL
COMMUNITY
Start: 2022-04-11 | End: 2022-01-01

## 2022-04-21 NOTE — PROGRESS NOTES
"PATIENT: Noemy Niño  MRN: [id number]  DATE: 4/21/2022    Chief Complaint: anemia    Subjective:     History of Present Illness:     PCP -Dr.Haresh Rodriguez    Referred for cachexia evaluation    Her baseline weight is 115 lb    August 2021, she weighed 103 lb    February 2022, weight 88 lb    Outside records reviewed    CT chest with contrast February 2, 2022 - no suspicious mass or lymphadenopathy.  Left pacemaker, prior sternotomy, mitral valve prosthesis.  Multichamber cardiac enlargement.    CT abdomen/pelvis with contrast February 2, 2022-few punctate renal stones and subcentimeter hypodensities, likely simple cysts.  Question gastric mucosal thickening/atrophy.  No focal mass or surrounding inflammation.  No bowel obstruction.  No suspicious mass or lymphadenopathy.  Unremarkable liver, spleen and pancreas.    CBC January 24, 2022-hemoglobin 8.1, WBC 3.2, MCV 94, platelets 171, glucose 122, creatinine 1.1, albumin 4.4, bilirubin 1.6, AST 44, ALT 10, TSH 0.8, ferritin 51    INTERVAL HISTORY:   -severe cachexia workup reviewed and reveals macrocytic anemia with functional iron deficiency.  Hepatitis B/C and HIV serology negative.  TACOS unremarkable.  SPEP and immunofixation negative.  Retic count and erythropoietin level only mildly elevated.  LDH grossly elevated at 1142  -follows with Cardiology, Dr. Kendrick Cardenas at [facility]  -bone marrow biopsy 03/24/2022 unremarkable    Past Medical, Surgical, Family and Social History Reviewed.    Medications and Allergies reviewed    Review of Systems   Constitutional: Positive for fatigue and unexpected weight change. Negative for fever.     Objective:     Vitals:    04/21/22 1013   BP: 127/62   BP Location: Left arm   Patient Position: Sitting   BP Method: Medium (Automatic)   Pulse: 70   Resp: 19   Temp: 97.2 °F (36.2 °C)   TempSrc: Oral   SpO2: 98%   Weight: 41 kg (90 lb 6.2 oz)   Height: 5' 7" (1.702 m)       BMI: Body mass index is 14.16 kg/m².    Physical " Exam  Vitals and nursing note reviewed.   Constitutional:       General: She is not in acute distress.  Pulmonary:      Effort: Pulmonary effort is normal.      Breath sounds: Normal breath sounds.   Neurological:      Mental Status: She is alert. Mental status is at baseline.       Assessment:       1. Anemia, chronic disease    2. Other iron deficiency anemias    3. Cachexia    4. Nutritional anemia, unspecified     5. Anxiety disorder, unspecified       Plan:   Anemia 2/2 Chronic Disease and CKD  -her LDH is very elevated  -HIV, hepatitis-B, hepatitis-C, TACOS, SPEP, immunofixation negative  -retic and erythropoietin level only mildly elevated  -bone marrow biopsy 03/24/2022 - unremarkable  -her anemia is secondary to chronic disease, chronic kidney disease, pulmonary fibrosis  -start Venofer 200 mg IV piggyback every 2 weeks and Retacrit 44503 units every 2 weeks for symptomatic anemia  -repeat labs and follow-up before 4th injection    Cachexia  -CT abdomen/pelvis/chest largely unremarkable  -f/u with Dr. Kenney    Pulmonary fibrosis  -follows with pulmonology    CC: Dr.Haresh Rodriguez

## 2022-04-22 ENCOUNTER — INFUSION (OUTPATIENT)
Dept: INFUSION THERAPY | Facility: HOSPITAL | Age: 82
End: 2022-04-22
Attending: INTERNAL MEDICINE
Payer: MEDICARE

## 2022-04-22 VITALS
HEIGHT: 67 IN | TEMPERATURE: 98 F | SYSTOLIC BLOOD PRESSURE: 123 MMHG | OXYGEN SATURATION: 97 % | RESPIRATION RATE: 17 BRPM | WEIGHT: 90.38 LBS | DIASTOLIC BLOOD PRESSURE: 57 MMHG | BODY MASS INDEX: 14.19 KG/M2 | HEART RATE: 73 BPM

## 2022-04-22 DIAGNOSIS — D63.8 ANEMIA, CHRONIC DISEASE: ICD-10-CM

## 2022-04-22 DIAGNOSIS — D50.8 OTHER IRON DEFICIENCY ANEMIAS: Primary | ICD-10-CM

## 2022-04-22 LAB — EPO SERPL-ACNC: 49.8 MIU/ML (ref 2.6–18.5)

## 2022-04-22 PROCEDURE — 96372 THER/PROPH/DIAG INJ SC/IM: CPT | Mod: 59

## 2022-04-22 PROCEDURE — 96365 THER/PROPH/DIAG IV INF INIT: CPT

## 2022-04-22 PROCEDURE — A4216 STERILE WATER/SALINE, 10 ML: HCPCS | Performed by: INTERNAL MEDICINE

## 2022-04-22 PROCEDURE — 63600175 PHARM REV CODE 636 W HCPCS: Performed by: INTERNAL MEDICINE

## 2022-04-22 PROCEDURE — 25000003 PHARM REV CODE 250: Performed by: INTERNAL MEDICINE

## 2022-04-22 RX ORDER — SODIUM CHLORIDE 0.9 % (FLUSH) 0.9 %
10 SYRINGE (ML) INJECTION
Status: DISCONTINUED | OUTPATIENT
Start: 2022-04-22 | End: 2022-04-22 | Stop reason: HOSPADM

## 2022-04-22 RX ORDER — HEPARIN 100 UNIT/ML
500 SYRINGE INTRAVENOUS
Status: DISCONTINUED | OUTPATIENT
Start: 2022-04-22 | End: 2022-04-22 | Stop reason: HOSPADM

## 2022-04-22 RX ADMIN — SODIUM CHLORIDE: 0.9 INJECTION, SOLUTION INTRAVENOUS at 11:04

## 2022-04-22 RX ADMIN — EPOETIN ALFA-EPBX 40000 UNITS: 40000 INJECTION, SOLUTION INTRAVENOUS; SUBCUTANEOUS at 01:04

## 2022-04-22 RX ADMIN — IRON SUCROSE 200 MG: 20 INJECTION, SOLUTION INTRAVENOUS at 12:04

## 2022-04-22 RX ADMIN — Medication 10 ML: at 01:04

## 2022-04-22 NOTE — NURSING
Patient tolerated infusion and injection well. Patient monitored for 30 minutes post infusion without signs and symptoms of an infusion reaction. No questions or concerns at this time. Next appointment scheduled.

## 2022-05-06 ENCOUNTER — INFUSION (OUTPATIENT)
Dept: INFUSION THERAPY | Facility: HOSPITAL | Age: 82
End: 2022-05-06
Attending: INTERNAL MEDICINE
Payer: MEDICARE

## 2022-05-06 VITALS
TEMPERATURE: 98 F | BODY MASS INDEX: 14.19 KG/M2 | DIASTOLIC BLOOD PRESSURE: 66 MMHG | RESPIRATION RATE: 17 BRPM | OXYGEN SATURATION: 98 % | HEIGHT: 67 IN | HEART RATE: 70 BPM | SYSTOLIC BLOOD PRESSURE: 143 MMHG | WEIGHT: 90.38 LBS

## 2022-05-06 DIAGNOSIS — D50.8 OTHER IRON DEFICIENCY ANEMIAS: Primary | ICD-10-CM

## 2022-05-06 DIAGNOSIS — D63.8 ANEMIA, CHRONIC DISEASE: ICD-10-CM

## 2022-05-06 PROCEDURE — 96365 THER/PROPH/DIAG IV INF INIT: CPT

## 2022-05-06 PROCEDURE — 25000003 PHARM REV CODE 250: Performed by: INTERNAL MEDICINE

## 2022-05-06 PROCEDURE — 96372 THER/PROPH/DIAG INJ SC/IM: CPT | Mod: 59

## 2022-05-06 PROCEDURE — 63600175 PHARM REV CODE 636 W HCPCS: Performed by: INTERNAL MEDICINE

## 2022-05-06 RX ORDER — SODIUM CHLORIDE 0.9 % (FLUSH) 0.9 %
10 SYRINGE (ML) INJECTION
Status: DISCONTINUED | OUTPATIENT
Start: 2022-05-06 | End: 2022-05-06 | Stop reason: HOSPADM

## 2022-05-06 RX ORDER — HEPARIN 100 UNIT/ML
500 SYRINGE INTRAVENOUS
Status: DISCONTINUED | OUTPATIENT
Start: 2022-05-06 | End: 2022-05-06 | Stop reason: HOSPADM

## 2022-05-06 RX ADMIN — IRON SUCROSE 200 MG: 20 INJECTION, SOLUTION INTRAVENOUS at 11:05

## 2022-05-06 RX ADMIN — SODIUM CHLORIDE: 0.9 INJECTION, SOLUTION INTRAVENOUS at 11:05

## 2022-05-06 RX ADMIN — EPOETIN ALFA-EPBX 40000 UNITS: 40000 INJECTION, SOLUTION INTRAVENOUS; SUBCUTANEOUS at 12:05

## 2022-05-06 NOTE — NURSING
Pt received IV Venofer infusion dose 2/4. Pt tolerated it well. AVS given. Next appointment scheduled. Discharged with no acute distress.

## 2022-05-20 ENCOUNTER — INFUSION (OUTPATIENT)
Dept: INFUSION THERAPY | Facility: HOSPITAL | Age: 82
End: 2022-05-20
Attending: INTERNAL MEDICINE
Payer: MEDICARE

## 2022-05-20 VITALS
OXYGEN SATURATION: 97 % | DIASTOLIC BLOOD PRESSURE: 60 MMHG | TEMPERATURE: 98 F | RESPIRATION RATE: 16 BRPM | SYSTOLIC BLOOD PRESSURE: 127 MMHG | BODY MASS INDEX: 14.12 KG/M2 | WEIGHT: 90 LBS | HEART RATE: 70 BPM | HEIGHT: 67 IN

## 2022-05-20 DIAGNOSIS — D50.8 OTHER IRON DEFICIENCY ANEMIAS: Primary | ICD-10-CM

## 2022-05-20 DIAGNOSIS — D63.8 ANEMIA, CHRONIC DISEASE: ICD-10-CM

## 2022-05-20 PROCEDURE — 25000003 PHARM REV CODE 250: Performed by: INTERNAL MEDICINE

## 2022-05-20 PROCEDURE — 96372 THER/PROPH/DIAG INJ SC/IM: CPT | Mod: 59

## 2022-05-20 PROCEDURE — 96365 THER/PROPH/DIAG IV INF INIT: CPT

## 2022-05-20 PROCEDURE — 63600175 PHARM REV CODE 636 W HCPCS: Performed by: INTERNAL MEDICINE

## 2022-05-20 PROCEDURE — A4216 STERILE WATER/SALINE, 10 ML: HCPCS | Performed by: INTERNAL MEDICINE

## 2022-05-20 RX ORDER — HEPARIN 100 UNIT/ML
500 SYRINGE INTRAVENOUS
Status: DISCONTINUED | OUTPATIENT
Start: 2022-05-20 | End: 2022-05-20 | Stop reason: HOSPADM

## 2022-05-20 RX ORDER — SODIUM CHLORIDE 0.9 % (FLUSH) 0.9 %
10 SYRINGE (ML) INJECTION
Status: DISCONTINUED | OUTPATIENT
Start: 2022-05-20 | End: 2022-05-20 | Stop reason: HOSPADM

## 2022-05-20 RX ADMIN — SODIUM CHLORIDE: 0.9 INJECTION, SOLUTION INTRAVENOUS at 11:05

## 2022-05-20 RX ADMIN — IRON SUCROSE 200 MG: 20 INJECTION, SOLUTION INTRAVENOUS at 11:05

## 2022-05-20 RX ADMIN — Medication 10 ML: at 12:05

## 2022-05-20 RX ADMIN — EPOETIN ALFA-EPBX 40000 UNITS: 40000 INJECTION, SOLUTION INTRAVENOUS; SUBCUTANEOUS at 12:05

## 2022-05-20 NOTE — NURSING
Patient tolerated infusion and injection well. All questions and concerns addressed. Next appointment scheduled.

## 2022-05-31 ENCOUNTER — LAB VISIT (OUTPATIENT)
Dept: LAB | Facility: HOSPITAL | Age: 82
End: 2022-05-31
Attending: INTERNAL MEDICINE
Payer: MEDICARE

## 2022-05-31 DIAGNOSIS — D50.8 OTHER IRON DEFICIENCY ANEMIAS: ICD-10-CM

## 2022-05-31 DIAGNOSIS — F41.9 ANXIETY DISORDER, UNSPECIFIED: ICD-10-CM

## 2022-05-31 DIAGNOSIS — R64 CACHEXIA: ICD-10-CM

## 2022-05-31 DIAGNOSIS — D63.8 ANEMIA, CHRONIC DISEASE: ICD-10-CM

## 2022-05-31 LAB
ALBUMIN SERPL BCP-MCNC: 4.1 G/DL (ref 3.5–5.2)
ALP SERPL-CCNC: 57 U/L (ref 55–135)
ALT SERPL W/O P-5'-P-CCNC: 18 U/L (ref 10–44)
ANION GAP SERPL CALC-SCNC: 9 MMOL/L (ref 8–16)
AST SERPL-CCNC: 47 U/L (ref 10–40)
BASOPHILS # BLD AUTO: 0.04 K/UL (ref 0–0.2)
BASOPHILS NFR BLD: 1.3 % (ref 0–1.9)
BILIRUB SERPL-MCNC: 1.5 MG/DL (ref 0.1–1)
BUN SERPL-MCNC: 17 MG/DL (ref 8–23)
CALCIUM SERPL-MCNC: 9.6 MG/DL (ref 8.7–10.5)
CHLORIDE SERPL-SCNC: 101 MMOL/L (ref 95–110)
CO2 SERPL-SCNC: 30 MMOL/L (ref 23–29)
CREAT SERPL-MCNC: 1 MG/DL (ref 0.5–1.4)
DIFFERENTIAL METHOD: ABNORMAL
EOSINOPHIL # BLD AUTO: 0.1 K/UL (ref 0–0.5)
EOSINOPHIL NFR BLD: 3 % (ref 0–8)
ERYTHROCYTE [DISTWIDTH] IN BLOOD BY AUTOMATED COUNT: 18.1 % (ref 11.5–14.5)
ERYTHROCYTE [SEDIMENTATION RATE] IN BLOOD BY WESTERGREN METHOD: 9 MM/HR (ref 0–20)
EST. GFR  (AFRICAN AMERICAN): >60 ML/MIN/1.73 M^2
EST. GFR  (NON AFRICAN AMERICAN): 54 ML/MIN/1.73 M^2
FERRITIN SERPL-MCNC: 182 NG/ML (ref 20–300)
GLUCOSE SERPL-MCNC: 100 MG/DL (ref 70–110)
HCT VFR BLD AUTO: 33.1 % (ref 37–48.5)
HGB BLD-MCNC: 10 G/DL (ref 12–16)
IMM GRANULOCYTES # BLD AUTO: 0.01 K/UL (ref 0–0.04)
IMM GRANULOCYTES NFR BLD AUTO: 0.3 % (ref 0–0.5)
IRON SERPL-MCNC: 58 UG/DL (ref 30–160)
LDH SERPL L TO P-CCNC: 888 U/L (ref 110–260)
LYMPHOCYTES # BLD AUTO: 0.7 K/UL (ref 1–4.8)
LYMPHOCYTES NFR BLD: 24 % (ref 18–48)
MCH RBC QN AUTO: 28.9 PG (ref 27–31)
MCHC RBC AUTO-ENTMCNC: 30.2 G/DL (ref 32–36)
MCV RBC AUTO: 96 FL (ref 82–98)
MONOCYTES # BLD AUTO: 0.4 K/UL (ref 0.3–1)
MONOCYTES NFR BLD: 13.5 % (ref 4–15)
NEUTROPHILS # BLD AUTO: 1.8 K/UL (ref 1.8–7.7)
NEUTROPHILS NFR BLD: 57.9 % (ref 38–73)
NRBC BLD-RTO: 0 /100 WBC
PLATELET # BLD AUTO: 105 K/UL (ref 150–450)
PMV BLD AUTO: ABNORMAL FL (ref 9.2–12.9)
POTASSIUM SERPL-SCNC: 4.4 MMOL/L (ref 3.5–5.1)
PROT SERPL-MCNC: 6.7 G/DL (ref 6–8.4)
RBC # BLD AUTO: 3.46 M/UL (ref 4–5.4)
RETICS/RBC NFR AUTO: 1.9 % (ref 0.5–2.5)
SATURATED IRON: 17 % (ref 20–50)
SODIUM SERPL-SCNC: 140 MMOL/L (ref 136–145)
T4 FREE SERPL-MCNC: 1.38 NG/DL (ref 0.71–1.51)
TOTAL IRON BINDING CAPACITY: 342 UG/DL (ref 250–450)
TRANSFERRIN SERPL-MCNC: 231 MG/DL (ref 200–375)
TSH SERPL DL<=0.005 MIU/L-ACNC: 0.31 UIU/ML (ref 0.4–4)
URATE SERPL-MCNC: 4.6 MG/DL (ref 2.4–5.7)
WBC # BLD AUTO: 3.04 K/UL (ref 3.9–12.7)

## 2022-05-31 PROCEDURE — 85045 AUTOMATED RETICULOCYTE COUNT: CPT | Performed by: INTERNAL MEDICINE

## 2022-05-31 PROCEDURE — 36415 COLL VENOUS BLD VENIPUNCTURE: CPT | Performed by: INTERNAL MEDICINE

## 2022-05-31 PROCEDURE — 84550 ASSAY OF BLOOD/URIC ACID: CPT | Performed by: INTERNAL MEDICINE

## 2022-05-31 PROCEDURE — 84443 ASSAY THYROID STIM HORMONE: CPT | Performed by: INTERNAL MEDICINE

## 2022-05-31 PROCEDURE — 80053 COMPREHEN METABOLIC PANEL: CPT | Performed by: INTERNAL MEDICINE

## 2022-05-31 PROCEDURE — 85025 COMPLETE CBC W/AUTO DIFF WBC: CPT | Performed by: INTERNAL MEDICINE

## 2022-05-31 PROCEDURE — 83615 LACTATE (LD) (LDH) ENZYME: CPT | Performed by: INTERNAL MEDICINE

## 2022-05-31 PROCEDURE — 82728 ASSAY OF FERRITIN: CPT | Performed by: INTERNAL MEDICINE

## 2022-05-31 PROCEDURE — 84466 ASSAY OF TRANSFERRIN: CPT | Performed by: INTERNAL MEDICINE

## 2022-05-31 PROCEDURE — 84439 ASSAY OF FREE THYROXINE: CPT | Performed by: INTERNAL MEDICINE

## 2022-05-31 PROCEDURE — 85652 RBC SED RATE AUTOMATED: CPT | Performed by: INTERNAL MEDICINE

## 2022-06-01 ENCOUNTER — OFFICE VISIT (OUTPATIENT)
Dept: HEMATOLOGY/ONCOLOGY | Facility: CLINIC | Age: 82
End: 2022-06-01
Payer: MEDICARE

## 2022-06-01 VITALS
BODY MASS INDEX: 13.54 KG/M2 | WEIGHT: 86.44 LBS | HEART RATE: 72 BPM | RESPIRATION RATE: 18 BRPM | SYSTOLIC BLOOD PRESSURE: 107 MMHG | OXYGEN SATURATION: 95 % | DIASTOLIC BLOOD PRESSURE: 56 MMHG

## 2022-06-01 DIAGNOSIS — R64 CACHEXIA: ICD-10-CM

## 2022-06-01 DIAGNOSIS — D63.8 ANEMIA, CHRONIC DISEASE: ICD-10-CM

## 2022-06-01 DIAGNOSIS — D50.8 OTHER IRON DEFICIENCY ANEMIAS: Primary | ICD-10-CM

## 2022-06-01 DIAGNOSIS — D53.9 NUTRITIONAL ANEMIA, UNSPECIFIED: ICD-10-CM

## 2022-06-01 PROCEDURE — 99214 OFFICE O/P EST MOD 30 MIN: CPT | Mod: S$GLB,,, | Performed by: INTERNAL MEDICINE

## 2022-06-01 PROCEDURE — 1159F MED LIST DOCD IN RCRD: CPT | Mod: CPTII,S$GLB,, | Performed by: INTERNAL MEDICINE

## 2022-06-01 PROCEDURE — 3078F PR MOST RECENT DIASTOLIC BLOOD PRESSURE < 80 MM HG: ICD-10-PCS | Mod: CPTII,S$GLB,, | Performed by: INTERNAL MEDICINE

## 2022-06-01 PROCEDURE — 1157F ADVNC CARE PLAN IN RCRD: CPT | Mod: CPTII,S$GLB,, | Performed by: INTERNAL MEDICINE

## 2022-06-01 PROCEDURE — 1126F AMNT PAIN NOTED NONE PRSNT: CPT | Mod: CPTII,S$GLB,, | Performed by: INTERNAL MEDICINE

## 2022-06-01 PROCEDURE — 1157F PR ADVANCE CARE PLAN OR EQUIV PRESENT IN MEDICAL RECORD: ICD-10-PCS | Mod: CPTII,S$GLB,, | Performed by: INTERNAL MEDICINE

## 2022-06-01 PROCEDURE — 99214 PR OFFICE/OUTPT VISIT, EST, LEVL IV, 30-39 MIN: ICD-10-PCS | Mod: S$GLB,,, | Performed by: INTERNAL MEDICINE

## 2022-06-01 PROCEDURE — 1160F RVW MEDS BY RX/DR IN RCRD: CPT | Mod: CPTII,S$GLB,, | Performed by: INTERNAL MEDICINE

## 2022-06-01 PROCEDURE — 3074F PR MOST RECENT SYSTOLIC BLOOD PRESSURE < 130 MM HG: ICD-10-PCS | Mod: CPTII,S$GLB,, | Performed by: INTERNAL MEDICINE

## 2022-06-01 PROCEDURE — 99999 PR PBB SHADOW E&M-EST. PATIENT-LVL III: CPT | Mod: PBBFAC,,, | Performed by: INTERNAL MEDICINE

## 2022-06-01 PROCEDURE — 1101F PT FALLS ASSESS-DOCD LE1/YR: CPT | Mod: CPTII,S$GLB,, | Performed by: INTERNAL MEDICINE

## 2022-06-01 PROCEDURE — 3288F FALL RISK ASSESSMENT DOCD: CPT | Mod: CPTII,S$GLB,, | Performed by: INTERNAL MEDICINE

## 2022-06-01 PROCEDURE — 1159F PR MEDICATION LIST DOCUMENTED IN MEDICAL RECORD: ICD-10-PCS | Mod: CPTII,S$GLB,, | Performed by: INTERNAL MEDICINE

## 2022-06-01 PROCEDURE — 1160F PR REVIEW ALL MEDS BY PRESCRIBER/CLIN PHARMACIST DOCUMENTED: ICD-10-PCS | Mod: CPTII,S$GLB,, | Performed by: INTERNAL MEDICINE

## 2022-06-01 PROCEDURE — 3074F SYST BP LT 130 MM HG: CPT | Mod: CPTII,S$GLB,, | Performed by: INTERNAL MEDICINE

## 2022-06-01 PROCEDURE — 1101F PR PT FALLS ASSESS DOC 0-1 FALLS W/OUT INJ PAST YR: ICD-10-PCS | Mod: CPTII,S$GLB,, | Performed by: INTERNAL MEDICINE

## 2022-06-01 PROCEDURE — 3078F DIAST BP <80 MM HG: CPT | Mod: CPTII,S$GLB,, | Performed by: INTERNAL MEDICINE

## 2022-06-01 PROCEDURE — 99999 PR PBB SHADOW E&M-EST. PATIENT-LVL III: ICD-10-PCS | Mod: PBBFAC,,, | Performed by: INTERNAL MEDICINE

## 2022-06-01 PROCEDURE — 1126F PR PAIN SEVERITY QUANTIFIED, NO PAIN PRESENT: ICD-10-PCS | Mod: CPTII,S$GLB,, | Performed by: INTERNAL MEDICINE

## 2022-06-01 PROCEDURE — 3288F PR FALLS RISK ASSESSMENT DOCUMENTED: ICD-10-PCS | Mod: CPTII,S$GLB,, | Performed by: INTERNAL MEDICINE

## 2022-06-01 NOTE — PROGRESS NOTES
PATIENT: Noemy Niño  MRN: [id number]  DATE: 6/1/2022    Chief Complaint: anemia    Subjective:     History of Present Illness:     PCP -Dr.Haresh Rodriguez    Referred for cachexia evaluation    Her baseline weight is 115 lb    August 2021, she weighed 103 lb    February 2022, weight 88 lb    Outside records reviewed    CT chest with contrast February 2, 2022 - no suspicious mass or lymphadenopathy.  Left pacemaker, prior sternotomy, mitral valve prosthesis.  Multichamber cardiac enlargement.    CT abdomen/pelvis with contrast February 2, 2022-few punctate renal stones and subcentimeter hypodensities, likely simple cysts.  Question gastric mucosal thickening/atrophy.  No focal mass or surrounding inflammation.  No bowel obstruction.  No suspicious mass or lymphadenopathy.  Unremarkable liver, spleen and pancreas.    CBC January 24, 2022-hemoglobin 8.1, WBC 3.2, MCV 94, platelets 171, glucose 122, creatinine 1.1, albumin 4.4, bilirubin 1.6, AST 44, ALT 10, TSH 0.8, ferritin 51    INTERVAL HISTORY:   -severe cachexia workup reviewed and reveals macrocytic anemia with functional iron deficiency.  Hepatitis B/C and HIV serology negative.  TACOS unremarkable.  SPEP and immunofixation negative.  Retic count and erythropoietin level only mildly elevated.  LDH grossly elevated at 1142  -follows with Cardiology, Dr. Kendrick Cardenas at [facility]  -bone marrow biopsy 03/24/2022 unremarkable  -getting IV iron, Venofer, feels better    Past Medical, Surgical, Family and Social History Reviewed.    Medications and Allergies reviewed    Review of Systems   Constitutional: Positive for fatigue and unexpected weight change. Negative for fever.     Objective:     Vitals:    06/01/22 1034   BP: (!) 107/56   BP Location: Right arm   Patient Position: Sitting   Pulse: 72   Resp: 18   SpO2: 95%   Weight: 39.2 kg (86 lb 6.7 oz)       BMI: Body mass index is 13.54 kg/m².    Physical Exam  Vitals and nursing note reviewed.   Constitutional:        General: She is not in acute distress.  Pulmonary:      Effort: Pulmonary effort is normal.      Breath sounds: Normal breath sounds.   Neurological:      Mental Status: She is alert. Mental status is at baseline.       Assessment:       1. Other iron deficiency anemias    2. Anemia, chronic disease    3. Nutritional anemia, unspecified     4. Cachexia      Plan:   Anemia 2/2 Chronic Disease and CKD  -bone marrow biopsy 03/24/2022 - unremarkable  -feeling better with Venofer, has 1 more dose to go  -labs CBC and iron studies reviewed  -will repeat CBC and iron studies in 3 to 4 months    Cachexia  -CT abdomen/pelvis/chest largely unremarkable  -f/u with Dr. Kenney    Pulmonary fibrosis  -follows with pulmonology

## 2022-06-08 ENCOUNTER — INFUSION (OUTPATIENT)
Dept: INFUSION THERAPY | Facility: HOSPITAL | Age: 82
End: 2022-06-08
Attending: INTERNAL MEDICINE
Payer: MEDICARE

## 2022-06-08 ENCOUNTER — TELEPHONE (OUTPATIENT)
Dept: INFUSION THERAPY | Facility: HOSPITAL | Age: 82
End: 2022-06-08
Payer: MEDICARE

## 2022-06-08 VITALS
HEIGHT: 67 IN | TEMPERATURE: 98 F | WEIGHT: 86.44 LBS | BODY MASS INDEX: 13.57 KG/M2 | OXYGEN SATURATION: 96 % | DIASTOLIC BLOOD PRESSURE: 56 MMHG | SYSTOLIC BLOOD PRESSURE: 114 MMHG | HEART RATE: 70 BPM | RESPIRATION RATE: 18 BRPM

## 2022-06-08 DIAGNOSIS — D63.8 ANEMIA, CHRONIC DISEASE: ICD-10-CM

## 2022-06-08 DIAGNOSIS — D50.8 OTHER IRON DEFICIENCY ANEMIAS: Primary | ICD-10-CM

## 2022-06-08 PROCEDURE — 63600175 PHARM REV CODE 636 W HCPCS: Performed by: INTERNAL MEDICINE

## 2022-06-08 PROCEDURE — 25000003 PHARM REV CODE 250: Performed by: INTERNAL MEDICINE

## 2022-06-08 PROCEDURE — A4216 STERILE WATER/SALINE, 10 ML: HCPCS | Performed by: INTERNAL MEDICINE

## 2022-06-08 PROCEDURE — 96365 THER/PROPH/DIAG IV INF INIT: CPT

## 2022-06-08 RX ORDER — HEPARIN 100 UNIT/ML
500 SYRINGE INTRAVENOUS
Status: DISCONTINUED | OUTPATIENT
Start: 2022-06-08 | End: 2022-06-08 | Stop reason: HOSPADM

## 2022-06-08 RX ORDER — SODIUM CHLORIDE 0.9 % (FLUSH) 0.9 %
10 SYRINGE (ML) INJECTION
Status: DISCONTINUED | OUTPATIENT
Start: 2022-06-08 | End: 2022-06-08 | Stop reason: HOSPADM

## 2022-06-08 RX ADMIN — Medication 10 ML: at 12:06

## 2022-06-08 RX ADMIN — SODIUM CHLORIDE: 0.9 INJECTION, SOLUTION INTRAVENOUS at 11:06

## 2022-06-08 RX ADMIN — IRON SUCROSE 200 MG: 20 INJECTION, SOLUTION INTRAVENOUS at 11:06

## 2022-06-08 NOTE — NURSING
Tolerated iron sucrose infusion well, no s/s of discomfort or reaction noted, instructed to speak with MD if worsening of symptoms occur prior to next infusion such as increased shortness of breath, increased fatigue or weakness noted. Ambulated out of clinic.

## 2022-06-20 ENCOUNTER — TELEPHONE (OUTPATIENT)
Dept: HEMATOLOGY/ONCOLOGY | Facility: CLINIC | Age: 82
End: 2022-06-20
Payer: MEDICARE

## 2022-06-20 NOTE — TELEPHONE ENCOUNTER
----- Message from Nick Bernstein sent at 6/20/2022 12:25 PM CDT -----  Contact: pt  .Type:  Needs Medical Advice    Who Called: pt  Symptoms (please be specific): itching  How long has patient had these symptoms:  1 week  Pharmacy name and phone #:  [id number] - [place] - [address]   Phone: [phone]  Fax:  [phone]  Would the patient rather a call back or a response via [facility]? Call back  Best Call Back Number: [phone]   Additional Information: Pt.  is requesting something be call in for her regarding itching she needs some kind of relieve.

## 2022-06-20 NOTE — TELEPHONE ENCOUNTER
Pt states she has been itching all over body for 3 days. Denies rash. Has tried topical ointment with no relief. Advised pt to take OTC oral Benadryl as instructed on box. Call office tomorrow if she does not get relief from oral benadryl. Pt verbalizes understanding.

## 2022-10-12 NOTE — TELEPHONE ENCOUNTER
----- Message from Lupe Tamez sent at 10/12/2022 10:30 AM CDT -----  Type:  Needs Medical Advice    Who Called: pt  Symptoms (please be specific): pt needs to schedule a follow up appointment     Would the patient rather a call back or a response via [facility]? call  Best Call Back Number: [phone]  Additional Information:

## 2022-10-26 PROBLEM — J43.1 PANLOBULAR EMPHYSEMA: Status: ACTIVE | Noted: 2022-01-01

## 2022-10-26 PROBLEM — I27.20 PORTOPULMONARY HYPERTENSION: Status: ACTIVE | Noted: 2022-01-01

## 2022-10-26 PROBLEM — K76.6 PORTOPULMONARY HYPERTENSION: Status: ACTIVE | Noted: 2022-01-01

## 2022-10-26 PROBLEM — D69.6 THROMBOCYTOPENIA, UNSPECIFIED: Status: ACTIVE | Noted: 2022-01-01

## 2022-10-26 NOTE — PROGRESS NOTES
PATIENT: Noemy Niño  MRN: [id number]  DATE: 10/26/2022    Chief Complaint: anemia    Subjective:     History of Present Illness:   Hpi as per Dr García's office visit 6/1/22, verified with pt at today's visit  PCP -Dr.Haresh Rodriguez    Referred for cachexia evaluation    Her baseline weight is 115 lb    August 2021, she weighed 103 lb    February 2022, weight 88 lb    Outside records reviewed    CT chest with contrast February 2, 2022 - no suspicious mass or lymphadenopathy.  Left pacemaker, prior sternotomy, mitral valve prosthesis.  Multichamber cardiac enlargement.    CT abdomen/pelvis with contrast February 2, 2022-few punctate renal stones and subcentimeter hypodensities, likely simple cysts.  Question gastric mucosal thickening/atrophy.  No focal mass or surrounding inflammation.  No bowel obstruction.  No suspicious mass or lymphadenopathy.  Unremarkable liver, spleen and pancreas.    CBC January 24, 2022-hemoglobin 8.1, WBC 3.2, MCV 94, platelets 171, glucose 122, creatinine 1.1, albumin 4.4, bilirubin 1.6, AST 44, ALT 10, TSH 0.8, ferritin 51      -severe cachexia workup reviewed and reveals macrocytic anemia with functional iron deficiency.  Hepatitis B/C and HIV serology negative.  TACOS unremarkable.  SPEP and immunofixation negative.  Retic count and erythropoietin level only mildly elevated.  LDH grossly elevated at 1142  -follows with Cardiology, Dr. Kendrick Cardenas at [facility]  -bone marrow biopsy 03/24/2022 unremarkable  -getting IV iron, Venofer, feels better    INTERVAL HISTORY:   -here for anemia and cachexia follow up  -states she still gets tired and takes nap during afternoon, has LUGO  -was excited that today she gained a pound weight 85 #  -she sees pulmonology at [facility] with astham, bronchiolitis, lung nodule, pulmonary htn, chronic respiratory failure; 10/10/22 pulmonologist discussed with pt 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  -she reports she is still getting around fairly well driving ownself to appts from [facility] to here in [place] or appts at [facility], going to store.  -Denies fever, chest pain, abdominal pain, n/v/d, constipation, hemoptysis, hematemesis, melena, hematuria.   -remains on long term coumadin for afib history      Past Medical, Surgical, Family and Social History Reviewed.    Medications and Allergies reviewed    Review of Systems   Constitutional:  Positive for fatigue and unexpected weight change. Negative for fever.   Respiratory:  Positive for shortness of breath (san). Negative for cough.    Cardiovascular:  Negative for chest pain.   Gastrointestinal:  Negative for blood in stool, nausea and vomiting.   Genitourinary:  Negative for dysuria, flank pain, hematuria and pelvic pain.        Frequency at night, up every two hours to urinate, disrupting sleep pattern   Neurological:  Positive for weakness (generalized weakness).   Hematological:  Negative for adenopathy.   Objective:     Vitals:    10/26/22 1352   BP: 122/62   Pulse: 70   SpO2: 98%   Weight: 38.9 kg (85 lb 12.1 oz)       BMI: Body mass index is 13.43 kg/m².    Physical Exam  Vitals and nursing note reviewed.   Constitutional:       General: She is not in acute distress.     Comments: cachetic   HENT:      Right Ear: External ear normal.      Left Ear: External ear normal.      Mouth/Throat:      Mouth: Mucous membranes are moist.      Pharynx: Oropharynx is clear.   Eyes:      General: No scleral icterus.     Conjunctiva/sclera: Conjunctivae normal.   Cardiovascular:      Rate and Rhythm: Normal rate and regular rhythm.      Pulses: Normal pulses.      Heart sounds: Normal heart sounds. No murmur heard.  Pulmonary:      Effort: Pulmonary effort is normal. No respiratory distress.      Breath sounds: Normal breath sounds.   Abdominal:      General: Bowel sounds are normal. There is no distension.      Palpations: Abdomen is soft.       Tenderness: There is no abdominal tenderness.   Musculoskeletal:      Cervical back: Normal range of motion and neck supple. No rigidity.      Right lower leg: No edema.      Left lower leg: No edema.   Lymphadenopathy:      Cervical: No cervical adenopathy.   Skin:     General: Skin is warm and dry.      Coloration: Skin is not jaundiced.   Neurological:      Mental Status: She is alert and oriented to person, place, and time. Mental status is at baseline.      Gait: Gait normal.   Psychiatric:         Mood and Affect: Mood normal.         Behavior: Behavior normal.     Lab Results   Component Value Date    WBC 4.54 10/24/2022    HGB 8.3 (L) 10/24/2022    HCT 27.0 (L) 10/24/2022    MCV 98 10/24/2022     (L) 10/24/2022       Lab Results   Component Value Date    IRON 57 10/24/2022    TRANSFERRIN 215 10/24/2022    TIBC 318 10/24/2022    FESATURATED 18 (L) 10/24/2022      Lab Results   Component Value Date    FERRITIN 146 10/24/2022           Assessment:       1. Anemia, chronic disease    2. Urinary frequency    3. Other iron deficiency anemias    4. Cachexia    5. Nutritional anemia, unspecified     6. Stage 3a chronic kidney disease    7. Chronic atrial fibrillation    8. Portopulmonary hypertension    9. Thrombocytopenia, unspecified    10. Panlobular emphysema        Plan:   Anemia 2/2 Chronic Disease and CKD, ANALI, nutritional anemia  -bone marrow biopsy 03/24/2022 - unremarkable  -felt better after Venofer but fatigue returning, had previously been given epoetin anna-epbx injection   -labs CBC and iron studies reviewed, 10/24/22 labs with decrease in H/H to 8.3/27.0, normal tibc and normal ferritin. Addendum: Reviewed with Dr Randall, will try to increase ferritin to 200. Venofer ordered and pt notified of orders placed.   -will repeat CBC and iron studies in 3  months    Thrombocytopenia  -mild decrease on 10/24/22 labs  -continue to monitor at this time    CKD stage 3a  -10/24/22 labs ckd  stable  -continue to monitor    Cachexia  -CT abdomen/pelvis/chest largely unremarkable  -f/u with Dr. Kenney    Pulmonary fibrosis, panlobular emphysema, portopulmonary hypertension  -follows with pulmonology, last appt 10/10/22    Chronic afib  -chronic coumadin  -continue coumadin clinic follow up, management deferred to cardiology    Urinary frequency  -frequent night awakenings, denies uti s/s  -did not tolerate ditropan from pcp per pt  -urology referral, appt scheduled for 10/27/22        GRAY Botello  [facility]  Hematology/Oncology  [address]  [place]  [id number] [phone]

## 2022-10-27 NOTE — PROGRESS NOTES
[facility] - Urology   Clinic Note    SUBJECTIVE:     Chief Complaint   Patient presents with    Urinary Frequency     At night       Referral from: Merari Javier NP.    History of Present Illness:  Noemy Niño is a 82 y.o. female who presents to clinic for evaluation of nocturia, urgency, urge incontinence.    Patient presents for bothersome nocturia 4 times per night.  She also reports urgency and frequency every 2 hours.  She read reports urge incontinence requiring 3 pads per day.  She has tried oxybutynin 5 mg immediate release for this.  She says it might have worked but she discontinued it because she had dry eyes and dry mouth.  She is willing to try it again.  She denies any stress leakage.    Patient endorses no additional complaints at this time.    Past Medical History:   Diagnosis Date    A-fib     Allergy     Arthritis of neck     Disorder of kidney and ureter     Lung nodule     MVP (mitral valve prolapse)     Thyroid disease     Urinary tract infection        Past Surgical History:   Procedure Laterality Date    BONE MARROW BIOPSY Right 3/24/2022    Procedure: BIOPSY-BONE MARROW;  Surgeon: Royal García MD;  Location: [facility] OR;  Service: Oncology;  Laterality: Right;    CARDIAC PACEMAKER PLACEMENT      CARDIAC VALVE REPLACEMENT      CATARACT EXTRACTION      HYSTERECTOMY      NOSE SURGERY      REPLACEMENT OF PACEMAKER GENERATOR N/A 6/4/2019    Procedure: REPLACEMENT, PULSE GENERATOR, CARDIAC PACEMAKER;  Surgeon: Harry Gallo MD;  Location: [facility] CATH LAB/EP;  Service: Cardiology;  Laterality: N/A;       History reviewed. No pertinent family history.    Social History     Tobacco Use    Smoking status: Never    Smokeless tobacco: Never   Substance Use Topics    Alcohol use: No    Drug use: No       Current Outpatient Medications on File Prior to Visit   Medication Sig Dispense Refill    albuterol sulfate (PROAIR RESPICLICK) 90 mcg/actuation inhaler 2 puffs.      cholecalciferol, vitamin D3,  (VITAMIN D3) 50 mcg (2,000 unit) Cap Take 2,000 Int'l Units by mouth.      COUMADIN 5 mg tablet       cyanocobalamin, vitamin B-12, 5,000 mcg Cap Take by mouth.      cyproheptadine (PERIACTIN) 4 mg tablet Take 4 mg by mouth 3 (three) times daily as needed.      folic acid (FOLVITE) 1 MG tablet Take 1,000 mcg by mouth once daily.      furosemide (LASIX) 40 MG tablet Take 40 mg by mouth.      levothyroxine (SYNTHROID) 75 MCG tablet Take 75 mcg by mouth once daily.      metoprolol tartrate (LOPRESSOR) 50 MG tablet Take 50 mg by mouth.      sodium chloride (OCEAN) 0.65 % nasal spray 2 sprays by Nasal route as needed.      baclofen (LIORESAL) 10 MG tablet Take 10 mg by mouth as needed.      loratadine (CLARITIN) 10 mg tablet Take 10 mg by mouth daily as needed for Allergies.      megestroL (MEGACE) 40 MG Tab Take 40 mg by mouth 3 (three) times daily.      mirtazapine (REMERON SOL-TAB) 15 MG disintegrating tablet Take 15 mg by mouth every evening. 1/2 tablet      pravastatin (PRAVACHOL) 20 MG tablet       sertraline (ZOLOFT) 50 MG tablet       [DISCONTINUED] oxybutynin (DITROPAN) 5 MG Tab as needed.       No current facility-administered medications on file prior to visit.       Review of patient's allergies indicates:   Allergen Reactions    Tylenol [acetaminophen] Anaphylaxis    Clomipramine Other (See Comments)     unknown    Hydrocodone hcl Other (See Comments)     Recommended to stop  Using  it    Simvastatin (bulk) Other (See Comments)     Constipation    Tramadol Other (See Comments)     Constipation     Amiodarone      Other reaction(s): FIBROSIS    Atenolol      Other reaction(s): unknown    Cephalexin      Other reaction(s): unknown       Review of Systems:  A review of 10+ systems was conducted with pertinent positive and negative findings documented in HPI with all other systems reviewed and negative.    OBJECTIVE:     Estimated body mass index is 13.48 kg/m² as calculated from the following:    Height as of  "this encounter: 5' 7" (1.702 m).    Weight as of this encounter: 39 kg (86 lb 1.4 oz).    Vital Signs (Most Recent)  Vitals:    10/27/22 1309   BP: 120/68   Pulse: 80       Physical Exam:  GENERAL: patient sitting comfortably  HEENT: normocephalic  NECK: supple, no JVD  PULM: normal chest rise, no increased WOB  HEART: non-diaphoretic  ABDO: soft, nondistended, nontender  BACK: no CVA tenderness bilaterally  SKIN: warm, dry, well perfused  EXT: no bruising or edema  NEURO: grossly normal with no focal deficits  PSYCH: appropriate mood and affect    Genitourinary Exam:  deferred    Lab Results   Component Value Date    BUN 15 10/24/2022    CREATININE 1.0 10/24/2022    WBC 4.54 10/24/2022    HGB 8.3 (L) 10/24/2022    HCT 27.0 (L) 10/24/2022     (L) 10/24/2022    AST 40 10/24/2022    ALT 13 10/24/2022    ALKPHOS 61 10/24/2022    ALBUMIN 4.0 10/24/2022    INR 1.3 (H) 06/04/2019        Imaging:  No recent imaging to review    No results found for this or any previous visit (from the past 2160 hour(s)).  No results found for this or any previous visit (from the past 2160 hour(s)).      ASSESSMENT     1. Urinary frequency        PLAN:     Overactive bladder  Nocturia  Urge Incontinence     - Her history is suggestive of Overactive Bladder (OAB) with predominantly Urgency Urinary Incontinence (UUI). The presence or absence of incontinence as well as the relative contribution of stress or urgency incontinence can be further clarified with a 3-day volume-based voiding/incontinence diary. This will also help to screen for polyuria and help to guide us on further counseling on behavioral therapy including timed voiding and bladder training. We will review this on her return visit.  We also discussed behavioral therapies including fluid/dietary modification, timed voiding with bladder training, and pelvic floor exercises.    - Her reported symptoms today are relatively severe and therefore, I believe it would be " appropriate to begin pharmacologic therapy in addition to behavioral therapies.     - We will initiate oxybutynin 15 mg extended release once daily.  If she has bothersome side effects we will start Mirabegron. Follow-up in 6 weeks..    Basil Gonzalez MD  Urology  [facility]    Disclaimer: This note has been generated using voice-recognition software. There may be typographical errors that have been missed during proof-reading.

## 2022-10-28 NOTE — TELEPHONE ENCOUNTER
Called and spoke with pt. Pt scheduled for Venofer infusions :11/8, 11/15, 11/22, 11/29 at 1:30pm. Pt verbalized understanding.

## 2022-11-03 NOTE — TELEPHONE ENCOUNTER
Spoke with patient and she advised that she is having bad side effects from the medication she was prescribed from Dr. Gonzalez. The current medication she is taking is called oxybutyin 14mg. Patient states that she has dry mouth, blurry vision and not being able to use the restroom. I advised to patient that I would reach out to the doctor with this information and give her a follow up call. Patient voiced her understanding.

## 2022-11-03 NOTE — TELEPHONE ENCOUNTER
----- Message from Lupe Tamez sent at 11/3/2022 10:09 AM CDT -----  Type:  Needs Medical Advice    Who Called: pt  Symptoms (please be specific): pt is requesting to speak with the nurse in this office     Would the patient rather a call back or a response via [employer]? call  Best Call Back Number: [phone]  Additional Information:

## 2022-11-03 NOTE — TELEPHONE ENCOUNTER
Spoke with patient and advised to her per Dr. Gonzalez orders to stop taking the medication, Oxybutyin and he would like for her to come in next week for a follow up. Patient voiced her understanding and confirmed an appointment of Thursday, November 10 at 1:15pm. Patient confirmed.

## 2022-11-08 NOTE — NURSING
Pt received IV Venofer infusion dose 1/4. Pt tolerated it well. AVS given. Next appointment scheduled. Discharged with no acute distress.

## 2022-11-10 NOTE — PROGRESS NOTES
[facility] - Urology   Clinic Note    SUBJECTIVE:     Chief Complaint   Patient presents with    Other     Follow up        Referral from: No ref. provider found.    History of Present Illness:  Noemy Niño is a 82 y.o. female who presents to clinic for evaluation of nocturia, urgency, urge incontinence.    Patient presents for bothersome nocturia 4 times per night.  She also reports urgency and frequency every 2 hours.  She read reports urge incontinence requiring 3 pads per day.  She has tried oxybutynin 5 mg immediate release for this.  She says it might have worked but she discontinued it because she had dry eyes and dry mouth.  She is willing to try it again.  She denies any stress leakage.    11/10/2022  Patient returns for follow-up.  She was using oxybutynin said she would difficulty voiding with it.  She also had very bothersome dry eyes and dry mouth.  She did note that it improved her symptoms but she felt like she he was unable to void.  She also could not handle the side effects.    Patient endorses no additional complaints at this time.    Past Medical History:   Diagnosis Date    A-fib     Allergy     Arthritis of neck     Disorder of kidney and ureter     Lung nodule     MVP (mitral valve prolapse)     Thyroid disease     Urinary tract infection        Past Surgical History:   Procedure Laterality Date    BONE MARROW BIOPSY Right 3/24/2022    Procedure: BIOPSY-BONE MARROW;  Surgeon: Royal García MD;  Location: [facility] OR;  Service: Oncology;  Laterality: Right;    CARDIAC PACEMAKER PLACEMENT      CARDIAC VALVE REPLACEMENT      CATARACT EXTRACTION      HYSTERECTOMY      NOSE SURGERY      REPLACEMENT OF PACEMAKER GENERATOR N/A 6/4/2019    Procedure: REPLACEMENT, PULSE GENERATOR, CARDIAC PACEMAKER;  Surgeon: Harry Gallo MD;  Location: [facility] CATH LAB/EP;  Service: Cardiology;  Laterality: N/A;       History reviewed. No pertinent family history.    Social History     Tobacco Use    Smoking  status: Never    Smokeless tobacco: Never   Substance Use Topics    Alcohol use: No    Drug use: No       Current Outpatient Medications on File Prior to Visit   Medication Sig Dispense Refill    albuterol sulfate (PROAIR RESPICLICK) 90 mcg/actuation inhaler 2 puffs.      cholecalciferol, vitamin D3, (VITAMIN D3) 50 mcg (2,000 unit) Cap Take 2,000 Int'l Units by mouth.      cyanocobalamin, vitamin B-12, 5,000 mcg Cap Take by mouth.      cyproheptadine (PERIACTIN) 4 mg tablet Take 4 mg by mouth 3 (three) times daily as needed.      folic acid (FOLVITE) 1 MG tablet Take 1,000 mcg by mouth once daily.      furosemide (LASIX) 40 MG tablet Take 40 mg by mouth.      levothyroxine (SYNTHROID) 75 MCG tablet Take 75 mcg by mouth once daily.      loratadine (CLARITIN) 10 mg tablet Take 10 mg by mouth daily as needed for Allergies.      megestroL (MEGACE) 40 MG Tab Take 40 mg by mouth 3 (three) times daily.      metoprolol tartrate (LOPRESSOR) 50 MG tablet Take 50 mg by mouth.      mirtazapine (REMERON SOL-TAB) 15 MG disintegrating tablet Take 15 mg by mouth every evening. 1/2 tablet      sodium chloride (OCEAN) 0.65 % nasal spray 2 sprays by Nasal route as needed.      warfarin (COUMADIN) 3 MG tablet       [DISCONTINUED] oxybutynin (DITROPAN XL) 15 MG TR24 Take 1 tablet (15 mg total) by mouth once daily. 30 tablet 11    baclofen (LIORESAL) 10 MG tablet Take 10 mg by mouth as needed.      COUMADIN 5 mg tablet       pravastatin (PRAVACHOL) 20 MG tablet       sertraline (ZOLOFT) 50 MG tablet        No current facility-administered medications on file prior to visit.       Review of patient's allergies indicates:   Allergen Reactions    Tylenol [acetaminophen] Anaphylaxis    Clomipramine Other (See Comments)     unknown    Hydrocodone hcl Other (See Comments)     Recommended to stop  Using  it    Simvastatin (bulk) Other (See Comments)     Constipation    Tramadol Other (See Comments)     Constipation     Amiodarone      Other  "reaction(s): FIBROSIS    Atenolol      Other reaction(s): unknown    Cephalexin      Other reaction(s): unknown       Review of Systems:  A review of 10+ systems was conducted with pertinent positive and negative findings documented in HPI with all other systems reviewed and negative.    OBJECTIVE:     Estimated body mass index is 13.28 kg/m² as calculated from the following:    Height as of this encounter: 5' 7" (1.702 m).    Weight as of this encounter: 38.5 kg (84 lb 12.8 oz).    Vital Signs (Most Recent)  Vitals:    11/10/22 1316   BP: 123/65   Pulse: 69       Physical Exam:  GENERAL: patient sitting comfortably  HEENT: normocephalic  NECK: supple, no JVD  PULM: normal chest rise, no increased WOB  HEART: non-diaphoretic  ABDO: soft, nondistended, nontender  BACK: no CVA tenderness bilaterally  SKIN: warm, dry, well perfused  EXT: no bruising or edema  NEURO: grossly normal with no focal deficits  PSYCH: appropriate mood and affect    Genitourinary Exam:  deferred    Lab Results   Component Value Date    BUN 15 10/24/2022    CREATININE 1.0 10/24/2022    WBC 4.54 10/24/2022    HGB 8.3 (L) 10/24/2022    HCT 27.0 (L) 10/24/2022     (L) 10/24/2022    AST 40 10/24/2022    ALT 13 10/24/2022    ALKPHOS 61 10/24/2022    ALBUMIN 4.0 10/24/2022    INR 1.3 (H) 06/04/2019        Imaging:  No recent imaging to review    No results found for this or any previous visit (from the past 2160 hour(s)).  No results found for this or any previous visit (from the past 2160 hour(s)).      ASSESSMENT     1. Overactive bladder        PLAN:     Overactive bladder  Nocturia  Urge Incontinence     - Her history is suggestive of Overactive Bladder (OAB). We discussed a 3-day voiding diary to better understand voiding habits. We discussed first line therapies, such as behavioral therapies including fluid/dietary modification, timed voiding with bladder training, and pelvic floor exercises and pelvic floor PT.     - We also discussed " second line pharmacologic therapies with anticholinergics and beta-3 agonists, including the risks and benefits of both. Third line therapies with sacral neuromodulation and intravesical botox injections for bladder chemodenervation were discussed.     - We will initiate mirabegron 50 mg daily as patient has failed oxybutynin due to side effect profile.  We will consider next line therapies if this fails.  Follow-up in 2 months.      Basil Gonzalez MD  Urology  [facility]    Disclaimer: This note has been generated using voice-recognition software. There may be typographical errors that have been missed during proof-reading.

## 2022-11-14 NOTE — TELEPHONE ENCOUNTER
----- Message from Temi Alfred MA sent at 11/14/2022  4:53 PM CST -----  Contact: pt    ----- Message -----  From: Steve Porter  Sent: 11/14/2022   1:40 PM CST  To: [facility] Staff    Type:  Sooner Apoointment Request    Caller is requesting a sooner appointment.  Caller declined first available appointment listed below.  Caller will not accept being placed on the waitlist and is requesting a message be sent to doctor.  Name of Caller:pt   When is the first available appointment? N/a   Symptoms:est care  Would the patient rather a call back or a response via [employer]? Call back   Best Call Back Number:[phone]  Additional Information: would like an appointment in January 2023

## 2022-11-15 NOTE — NURSING
Pt received IV Venofer infusion dose 3/4. Pt tolerated it well. AVS given.  Next appointment scheduled. Discharged with no acute distress.

## 2022-11-22 NOTE — NURSING
Pt received IV Venofer infusion dose 3/4. Pt tolerated it well. AVS given.  Pt will follow up with MD. Discharged with no acute distress.

## 2022-11-29 NOTE — NURSING
Pt tolerated Venofer infusion 4 of 4 well.  No adverse reaction noted. No complaints at this time. IV flushed with NS and D/C per protocol. Patient left clinic in no acute distress.

## 2022-12-15 NOTE — PROGRESS NOTES
[facility] - Urology   Clinic Note    SUBJECTIVE:     Chief Complaint   Patient presents with    Other     6 week follow up        Referral from: No ref. provider found.    History of Present Illness:  Noemy Niño is a 82 y.o. female who presents to clinic for evaluation of nocturia, urgency, urge incontinence.    Patient presents for bothersome nocturia 4 times per night.  She also reports urgency and frequency every 2 hours.  She read reports urge incontinence requiring 3 pads per day.  She has tried oxybutynin 5 mg immediate release for this.  She says it might have worked but she discontinued it because she had dry eyes and dry mouth.  She is willing to try it again.  She denies any stress leakage.    11/10/2022  Patient returns for follow-up.  She was using oxybutynin said she would difficulty voiding with it.  She also had very bothersome dry eyes and dry mouth.  She did note that it improved her symptoms but she felt like she he was unable to void.  She also could not handle the side effects.    12/15/2022  Patient has been on mirabegron 50 mg daily reports this has significantly improved her symptoms.  She is having no urgency or urge incontinence during the day.  Frequency is mostly resolved.  She reports that she gets up 2 times per night previously she was getting up 4-5 times per night.  Overall she is very happy.    Patient endorses no additional complaints at this time.    Past Medical History:   Diagnosis Date    A-fib     Allergy     Arthritis of neck     Disorder of kidney and ureter     Lung nodule     MVP (mitral valve prolapse)     Thyroid disease     Urinary tract infection        Past Surgical History:   Procedure Laterality Date    BONE MARROW BIOPSY Right 3/24/2022    Procedure: BIOPSY-BONE MARROW;  Surgeon: Royal García MD;  Location: [facility];  Service: Oncology;  Laterality: Right;    CARDIAC PACEMAKER PLACEMENT      CARDIAC VALVE REPLACEMENT      CATARACT EXTRACTION      HYSTERECTOMY       NOSE SURGERY      REPLACEMENT OF PACEMAKER GENERATOR N/A 6/4/2019    Procedure: REPLACEMENT, PULSE GENERATOR, CARDIAC PACEMAKER;  Surgeon: Harry Gallo MD;  Location: [facility] CATH LAB/EP;  Service: Cardiology;  Laterality: N/A;       History reviewed. No pertinent family history.    Social History     Tobacco Use    Smoking status: Never    Smokeless tobacco: Never   Substance Use Topics    Alcohol use: No    Drug use: No       Current Outpatient Medications on File Prior to Visit   Medication Sig Dispense Refill    albuterol sulfate (PROAIR RESPICLICK) 90 mcg/actuation inhaler 2 puffs.      baclofen (LIORESAL) 10 MG tablet Take 10 mg by mouth as needed.      cholecalciferol, vitamin D3, (VITAMIN D3) 50 mcg (2,000 unit) Cap Take 2,000 Int'l Units by mouth.      cyanocobalamin, vitamin B-12, 5,000 mcg Cap Take by mouth.      cyproheptadine (PERIACTIN) 4 mg tablet Take 4 mg by mouth 3 (three) times daily as needed.      folic acid (FOLVITE) 1 MG tablet Take 1,000 mcg by mouth once daily.      furosemide (LASIX) 40 MG tablet Take 40 mg by mouth.      levothyroxine (SYNTHROID) 75 MCG tablet Take 75 mcg by mouth once daily.      loratadine (CLARITIN) 10 mg tablet Take 10 mg by mouth daily as needed for Allergies.      megestroL (MEGACE) 40 MG Tab Take 40 mg by mouth 3 (three) times daily.      metoprolol tartrate (LOPRESSOR) 50 MG tablet Take 50 mg by mouth.      mirabegron (MYRBETRIQ) 50 mg Tb24 Take 1 tablet (50 mg total) by mouth once daily. 30 tablet 11    mirtazapine (REMERON SOL-TAB) 15 MG disintegrating tablet Take 15 mg by mouth every evening. 1/2 tablet      pravastatin (PRAVACHOL) 20 MG tablet       sertraline (ZOLOFT) 50 MG tablet       sodium chloride (OCEAN) 0.65 % nasal spray 2 sprays by Nasal route as needed.      warfarin (COUMADIN) 3 MG tablet       [DISCONTINUED] COUMADIN 5 mg tablet        No current facility-administered medications on file prior to visit.       Review of patient's  "allergies indicates:   Allergen Reactions    Tylenol [acetaminophen] Anaphylaxis    Clomipramine Other (See Comments)     unknown    Hydrocodone hcl Other (See Comments)     Recommended to stop  Using  it    Simvastatin (bulk) Other (See Comments)     Constipation    Tramadol Other (See Comments)     Constipation     Amiodarone      Other reaction(s): FIBROSIS    Atenolol      Other reaction(s): unknown    Cephalexin      Other reaction(s): unknown       Review of Systems:  A review of 10+ systems was conducted with pertinent positive and negative findings documented in HPI with all other systems reviewed and negative.    OBJECTIVE:     Estimated body mass index is 13.67 kg/m² as calculated from the following:    Height as of this encounter: 5' 7" (1.702 m).    Weight as of this encounter: 39.6 kg (87 lb 4.8 oz).    Vital Signs (Most Recent)  Vitals:    12/15/22 1024   BP: (!) 114/54   Pulse: 69       Physical Exam:  GENERAL: patient sitting comfortably  HEENT: normocephalic  NECK: supple, no JVD  PULM: normal chest rise, no increased WOB  HEART: non-diaphoretic  ABDO: soft, nondistended, nontender  BACK: no CVA tenderness bilaterally  SKIN: warm, dry, well perfused  EXT: no bruising or edema  NEURO: grossly normal with no focal deficits  PSYCH: appropriate mood and affect    Genitourinary Exam:  deferred    Lab Results   Component Value Date    BUN 15 10/24/2022    CREATININE 1.0 10/24/2022    WBC 4.54 10/24/2022    HGB 8.3 (L) 10/24/2022    HCT 27.0 (L) 10/24/2022     (L) 10/24/2022    AST 40 10/24/2022    ALT 13 10/24/2022    ALKPHOS 61 10/24/2022    ALBUMIN 4.0 10/24/2022    INR 1.3 (H) 06/04/2019        Imaging:  No recent imaging to review    No results found for this or any previous visit (from the past 2160 hour(s)).  No results found for this or any previous visit (from the past 2160 hour(s)).      ASSESSMENT     1. Overactive bladder    2. Urge incontinence    3. Nocturia          PLAN: "     Overactive bladder  Nocturia  Urge Incontinence     - significantly improved with mirabegron 50 mg daily.  Will continue this regimen.  Follow-up in 1 year.      Basil Gonzalez MD  Urology  [facility]    Disclaimer: This note has been generated using voice-recognition software. There may be typographical errors that have been missed during proof-reading.

## 2022-12-23 NOTE — PROGRESS NOTES
PATIENT: Noemy Niño  MRN: [id number]  DATE: 12/29/2022    Chief Complaint: anemia    Subjective:     History of Present Illness:   Hpi as per Dr García's office visit 6/1/22, verified with pt at today's visit  PCP -Dr.Haresh Rodriguez    Referred for cachexia evaluation    Her baseline weight is 115 lb    August 2021, she weighed 103 lb    February 2022, weight 88 lb    Outside records reviewed    CT chest with contrast February 2, 2022 - no suspicious mass or lymphadenopathy.  Left pacemaker, prior sternotomy, mitral valve prosthesis.  Multichamber cardiac enlargement.    CT abdomen/pelvis with contrast February 2, 2022-few punctate renal stones and subcentimeter hypodensities, likely simple cysts.  Question gastric mucosal thickening/atrophy.  No focal mass or surrounding inflammation.  No bowel obstruction.  No suspicious mass or lymphadenopathy.  Unremarkable liver, spleen and pancreas.    CBC January 24, 2022-hemoglobin 8.1, WBC 3.2, MCV 94, platelets 171, glucose 122, creatinine 1.1, albumin 4.4, bilirubin 1.6, AST 44, ALT 10, TSH 0.8, ferritin 51      -severe cachexia workup reviewed and reveals macrocytic anemia with functional iron deficiency.  Hepatitis B/C and HIV serology negative.  TACOS unremarkable.  SPEP and immunofixation negative.  Retic count and erythropoietin level only mildly elevated.  LDH grossly elevated at 1142  -follows with Cardiology, Dr. Kendrick Cardenas at [facility]  -bone marrow biopsy 03/24/2022 unremarkable  -getting IV iron, Venofer, feels better    INTERVAL HISTORY:   -here for anemia and cachexia follow up  -received venofer x4 doses 11/2022, feels these did not boost her like previous infusion did  -states she still gets tired and takes nap during afternoon, has LUGO  -was excited that today she gained a pound weight 85 #  -she sees pulmonology at [facility] with astham, bronchiolitis, lung nodule, pulmonary htn, chronic respiratory failure; 10/10/22 pulmonologist discussed with pt  "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  -she reports she is still getting around fairly well driving ownself to appts from [place] to here in [place] or appts at [facility], her three sons handle shopping now and frequently bring her meals with some to put in freezer.  -Denies fever, chest pain, abdominal pain, n/v/d, constipation, hemoptysis, hematemesis, melena, hematuria.   -remains on long term coumadin for afib history      Past Medical, Surgical, Family and Social History Reviewed.    Medications and Allergies reviewed    Review of Systems   Constitutional:  Positive for fatigue. Negative for fever and unexpected weight change.        Weight has been stable   Respiratory:  Positive for shortness of breath (san). Negative for cough.    Cardiovascular:  Negative for chest pain.   Gastrointestinal:  Negative for blood in stool, nausea and vomiting.   Genitourinary:  Negative for dysuria, flank pain, hematuria and pelvic pain.        Mirabegron 50 mg daily reports this has significantly improved her urinary symptoms since last visit, following Dr Gonzalez/urology   Neurological:  Positive for weakness (generalized weakness).   Hematological:  Negative for adenopathy.   Objective:     Vitals:    12/29/22 1303   BP: (!) 125/58   Pulse: 74   Temp: 97.7 °F (36.5 °C)   SpO2: 98%   Weight: 39.1 kg (86 lb 3.2 oz)   Height: 5' 7" (1.702 m)         BMI: Body mass index is 13.5 kg/m².    Physical Exam  Vitals and nursing note reviewed.   Constitutional:       General: She is not in acute distress.     Comments: cachetic   HENT:      Right Ear: External ear normal.      Left Ear: External ear normal.      Mouth/Throat:      Mouth: Mucous membranes are moist.      Pharynx: Oropharynx is clear.   Eyes:      General: No scleral icterus.     Conjunctiva/sclera: Conjunctivae normal.   Cardiovascular:      Rate and Rhythm: Normal rate and regular rhythm.      " Pulses: Normal pulses.      Heart sounds: Normal heart sounds. No murmur heard.  Pulmonary:      Effort: Pulmonary effort is normal. No respiratory distress.      Breath sounds: Normal breath sounds.   Abdominal:      General: Bowel sounds are normal. There is no distension.      Palpations: Abdomen is soft.      Tenderness: There is no abdominal tenderness.   Musculoskeletal:      Cervical back: Normal range of motion and neck supple. No rigidity.      Right lower leg: No edema.      Left lower leg: No edema.   Lymphadenopathy:      Cervical: No cervical adenopathy.   Skin:     General: Skin is warm and dry.      Coloration: Skin is not jaundiced.   Neurological:      Mental Status: She is alert and oriented to person, place, and time. Mental status is at baseline.      Gait: Gait normal.   Psychiatric:         Mood and Affect: Mood normal.         Behavior: Behavior normal.     Lab Results   Component Value Date    WBC 3.63 (L) 12/27/2022    HGB 9.3 (L) 12/27/2022    HCT 29.8 (L) 12/27/2022    MCV 96 12/27/2022     (L) 12/27/2022       Lab Results   Component Value Date    IRON 58 12/27/2022    TRANSFERRIN 221 12/27/2022    TIBC 327 12/27/2022    FESATURATED 18 (L) 12/27/2022      Lab Results   Component Value Date    FERRITIN 180 12/27/2022       Assessment:       1. Anemia, chronic disease    2. Nutritional anemia, unspecified     3. Other iron deficiency anemias    4. Stage 3a chronic kidney disease    5. Thrombocytopenia, unspecified    6. Cachexia    7. Chronic atrial fibrillation    8. OAB (overactive bladder)    9. Itching          Plan:   Anemia 2/2 Chronic Disease and CKD, ANALI, nutritional anemia  -bone marrow biopsy 03/24/2022 - unremarkable  -felt better after Venofer but fatigue returning, had previously been given epoetin anna-epbx injection   -labs CBC and iron studies reviewed, 12/27/22 labs with increase in H/H to 9.3/29.8, low iron sat, normal tibc and normal ferritin. Goal ferritin  200.  -received venofer x4 doses 11/2022, will order repeat of venofer x4 doses  -will repeat CBC and iron studies in 3  months    Thrombocytopenia  -mild decrease on 12/27/22 labs, stable  -continue to monitor at this time    CKD stage 3a  -12/27/22 labs ckd stable  -continue to monitor    Cachexia  -CT abdomen/pelvis/chest largely unremarkable  -f/u with Dr. Kenney    Pulmonary fibrosis, panlobular emphysema, portopulmonary hypertension  -follows with pulmonology, last appt 10/10/22    Chronic afib  -chronic coumadin  -continue coumadin clinic follow up, management deferred to cardiology    Overactive bladder  -improved with mirabegron 50 mg daily   -management deferred to urology/Dr Marva Javier, NP-C  [facility]  Hematology/Oncology  [address] [place] [address] [address]  [place]  [id number] [phone]

## 2022-12-30 NOTE — TELEPHONE ENCOUNTER
Confirmed infusion appointments with the patient.  1/4 at 130p  1/11 at 1p  1/18 at 1p  1/25 at 1p

## 2023-01-01 ENCOUNTER — INFUSION (OUTPATIENT)
Dept: INFUSION THERAPY | Facility: HOSPITAL | Age: 83
End: 2023-01-01
Attending: INTERNAL MEDICINE
Payer: MEDICARE

## 2023-01-01 ENCOUNTER — LAB VISIT (OUTPATIENT)
Dept: LAB | Facility: HOSPITAL | Age: 83
End: 2023-01-01
Payer: MEDICARE

## 2023-01-01 ENCOUNTER — LAB VISIT (OUTPATIENT)
Dept: LAB | Facility: HOSPITAL | Age: 83
End: 2023-01-01
Attending: INTERNAL MEDICINE
Payer: MEDICARE

## 2023-01-01 ENCOUNTER — TELEPHONE (OUTPATIENT)
Dept: INFUSION THERAPY | Facility: HOSPITAL | Age: 83
End: 2023-01-01
Payer: MEDICARE

## 2023-01-01 ENCOUNTER — LAB VISIT (OUTPATIENT)
Dept: LAB | Facility: HOSPITAL | Age: 83
End: 2023-01-01
Attending: NURSE PRACTITIONER
Payer: MEDICARE

## 2023-01-01 ENCOUNTER — TELEPHONE (OUTPATIENT)
Dept: HEMATOLOGY/ONCOLOGY | Facility: CLINIC | Age: 83
End: 2023-01-01
Payer: MEDICARE

## 2023-01-01 ENCOUNTER — TELEPHONE (OUTPATIENT)
Dept: UROLOGY | Facility: CLINIC | Age: 83
End: 2023-01-01
Payer: MEDICARE

## 2023-01-01 ENCOUNTER — OFFICE VISIT (OUTPATIENT)
Dept: HEMATOLOGY/ONCOLOGY | Facility: CLINIC | Age: 83
End: 2023-01-01
Payer: MEDICARE

## 2023-01-01 VITALS
RESPIRATION RATE: 18 BRPM | RESPIRATION RATE: 18 BRPM | SYSTOLIC BLOOD PRESSURE: 113 MMHG | HEART RATE: 70 BPM | WEIGHT: 86 LBS | DIASTOLIC BLOOD PRESSURE: 56 MMHG | BODY MASS INDEX: 13.5 KG/M2 | BODY MASS INDEX: 13.5 KG/M2 | WEIGHT: 86 LBS | TEMPERATURE: 98 F | OXYGEN SATURATION: 96 % | SYSTOLIC BLOOD PRESSURE: 132 MMHG | HEIGHT: 67 IN | DIASTOLIC BLOOD PRESSURE: 59 MMHG | HEIGHT: 67 IN | OXYGEN SATURATION: 98 % | TEMPERATURE: 98 F | HEART RATE: 70 BPM

## 2023-01-01 VITALS
OXYGEN SATURATION: 98 % | WEIGHT: 89.06 LBS | DIASTOLIC BLOOD PRESSURE: 64 MMHG | BODY MASS INDEX: 13.95 KG/M2 | SYSTOLIC BLOOD PRESSURE: 151 MMHG | HEART RATE: 83 BPM | RESPIRATION RATE: 16 BRPM

## 2023-01-01 VITALS
DIASTOLIC BLOOD PRESSURE: 53 MMHG | HEIGHT: 67 IN | HEART RATE: 71 BPM | WEIGHT: 84 LBS | SYSTOLIC BLOOD PRESSURE: 131 MMHG | RESPIRATION RATE: 16 BRPM | SYSTOLIC BLOOD PRESSURE: 128 MMHG | WEIGHT: 84 LBS | BODY MASS INDEX: 13.18 KG/M2 | OXYGEN SATURATION: 96 % | TEMPERATURE: 98 F | OXYGEN SATURATION: 97 % | TEMPERATURE: 98 F | BODY MASS INDEX: 13.83 KG/M2 | RESPIRATION RATE: 17 BRPM | HEIGHT: 67 IN | WEIGHT: 84 LBS | TEMPERATURE: 98 F | HEART RATE: 70 BPM | SYSTOLIC BLOOD PRESSURE: 106 MMHG | OXYGEN SATURATION: 96 % | BODY MASS INDEX: 13.18 KG/M2 | RESPIRATION RATE: 18 BRPM | HEART RATE: 68 BPM | BODY MASS INDEX: 13.18 KG/M2 | WEIGHT: 88.13 LBS | OXYGEN SATURATION: 96 % | HEIGHT: 67 IN | DIASTOLIC BLOOD PRESSURE: 52 MMHG | HEIGHT: 67 IN | TEMPERATURE: 98 F | DIASTOLIC BLOOD PRESSURE: 58 MMHG | DIASTOLIC BLOOD PRESSURE: 62 MMHG | HEART RATE: 70 BPM | SYSTOLIC BLOOD PRESSURE: 107 MMHG | RESPIRATION RATE: 17 BRPM

## 2023-01-01 VITALS
DIASTOLIC BLOOD PRESSURE: 54 MMHG | WEIGHT: 86.19 LBS | BODY MASS INDEX: 13.53 KG/M2 | OXYGEN SATURATION: 98 % | HEIGHT: 67 IN | HEIGHT: 67 IN | TEMPERATURE: 98 F | TEMPERATURE: 98 F | WEIGHT: 86 LBS | HEART RATE: 70 BPM | RESPIRATION RATE: 18 BRPM | BODY MASS INDEX: 13.5 KG/M2 | SYSTOLIC BLOOD PRESSURE: 124 MMHG | HEART RATE: 71 BPM | DIASTOLIC BLOOD PRESSURE: 60 MMHG | SYSTOLIC BLOOD PRESSURE: 113 MMHG | OXYGEN SATURATION: 98 % | RESPIRATION RATE: 18 BRPM

## 2023-01-01 VITALS
RESPIRATION RATE: 18 BRPM | OXYGEN SATURATION: 97 % | DIASTOLIC BLOOD PRESSURE: 56 MMHG | SYSTOLIC BLOOD PRESSURE: 119 MMHG | BODY MASS INDEX: 13.22 KG/M2 | HEART RATE: 72 BPM | WEIGHT: 84.44 LBS

## 2023-01-01 DIAGNOSIS — D63.8 ANEMIA, CHRONIC DISEASE: Primary | ICD-10-CM

## 2023-01-01 DIAGNOSIS — R64 CACHEXIA: ICD-10-CM

## 2023-01-01 DIAGNOSIS — D50.8 OTHER IRON DEFICIENCY ANEMIAS: Primary | ICD-10-CM

## 2023-01-01 DIAGNOSIS — D53.9 NUTRITIONAL ANEMIA, UNSPECIFIED: ICD-10-CM

## 2023-01-01 DIAGNOSIS — D63.8 ANEMIA, CHRONIC DISEASE: ICD-10-CM

## 2023-01-01 DIAGNOSIS — N18.31 STAGE 3A CHRONIC KIDNEY DISEASE: ICD-10-CM

## 2023-01-01 DIAGNOSIS — J43.1 PANLOBULAR EMPHYSEMA: ICD-10-CM

## 2023-01-01 DIAGNOSIS — D50.8 OTHER IRON DEFICIENCY ANEMIAS: ICD-10-CM

## 2023-01-01 DIAGNOSIS — D63.1 ANEMIA DUE TO STAGE 3A CHRONIC KIDNEY DISEASE: ICD-10-CM

## 2023-01-01 DIAGNOSIS — I48.20 CHRONIC ATRIAL FIBRILLATION: ICD-10-CM

## 2023-01-01 DIAGNOSIS — N32.81 OAB (OVERACTIVE BLADDER): ICD-10-CM

## 2023-01-01 DIAGNOSIS — K76.6 PORTOPULMONARY HYPERTENSION: ICD-10-CM

## 2023-01-01 DIAGNOSIS — I27.20 PORTOPULMONARY HYPERTENSION: ICD-10-CM

## 2023-01-01 DIAGNOSIS — J30.9 ALLERGIC RHINITIS, UNSPECIFIED SEASONALITY, UNSPECIFIED TRIGGER: Primary | ICD-10-CM

## 2023-01-01 DIAGNOSIS — N18.31 ANEMIA DUE TO STAGE 3A CHRONIC KIDNEY DISEASE: ICD-10-CM

## 2023-01-01 DIAGNOSIS — D69.6 THROMBOCYTOPENIA, UNSPECIFIED: ICD-10-CM

## 2023-01-01 DIAGNOSIS — J30.9 ALLERGIC RHINITIS, UNSPECIFIED SEASONALITY, UNSPECIFIED TRIGGER: ICD-10-CM

## 2023-01-01 DIAGNOSIS — D53.9 NUTRITIONAL ANEMIA, UNSPECIFIED: Primary | ICD-10-CM

## 2023-01-01 DIAGNOSIS — R62.7 FTT (FAILURE TO THRIVE) IN ADULT: ICD-10-CM

## 2023-01-01 LAB
25(OH)D3+25(OH)D2 SERPL-MCNC: 28 NG/ML (ref 30–96)
ALBUMIN SERPL BCP-MCNC: 4.2 G/DL (ref 3.5–5.2)
ALBUMIN SERPL BCP-MCNC: 4.4 G/DL (ref 3.5–5.2)
ALBUMIN SERPL BCP-MCNC: 4.5 G/DL (ref 3.5–5.2)
ALBUMIN SERPL BCP-MCNC: 4.8 G/DL (ref 3.5–5.2)
ALP SERPL-CCNC: 59 U/L (ref 55–135)
ALP SERPL-CCNC: 66 U/L (ref 55–135)
ALP SERPL-CCNC: 66 U/L (ref 55–135)
ALT SERPL W/O P-5'-P-CCNC: 12 U/L (ref 10–44)
ALT SERPL W/O P-5'-P-CCNC: 16 U/L (ref 10–44)
ALT SERPL W/O P-5'-P-CCNC: 9 U/L (ref 10–44)
ANION GAP SERPL CALC-SCNC: 10 MMOL/L (ref 8–16)
ANION GAP SERPL CALC-SCNC: 10 MMOL/L (ref 8–16)
ANION GAP SERPL CALC-SCNC: 8 MMOL/L (ref 8–16)
ANION GAP SERPL CALC-SCNC: 9 MMOL/L (ref 8–16)
AST SERPL-CCNC: 28 U/L (ref 10–40)
AST SERPL-CCNC: 37 U/L (ref 10–40)
AST SERPL-CCNC: 45 U/L (ref 10–40)
BASOPHILS # BLD AUTO: 0.02 K/UL (ref 0–0.2)
BASOPHILS # BLD AUTO: 0.03 K/UL (ref 0–0.2)
BASOPHILS NFR BLD: 0.6 % (ref 0–1.9)
BASOPHILS NFR BLD: 0.7 % (ref 0–1.9)
BILIRUB DIRECT SERPL-MCNC: 0.6 MG/DL (ref 0.1–0.3)
BILIRUB SERPL-MCNC: 1.2 MG/DL (ref 0.1–1)
BILIRUB SERPL-MCNC: 1.4 MG/DL (ref 0.1–1)
BILIRUB SERPL-MCNC: 1.8 MG/DL (ref 0.1–1)
BUN SERPL-MCNC: 13 MG/DL (ref 8–23)
BUN SERPL-MCNC: 14 MG/DL (ref 8–23)
BUN SERPL-MCNC: 16 MG/DL (ref 8–23)
BUN SERPL-MCNC: 17 MG/DL (ref 8–23)
CALCIUM SERPL-MCNC: 10 MG/DL (ref 8.7–10.5)
CALCIUM SERPL-MCNC: 10.1 MG/DL (ref 8.7–10.5)
CALCIUM SERPL-MCNC: 9.5 MG/DL (ref 8.7–10.5)
CALCIUM SERPL-MCNC: 9.6 MG/DL (ref 8.7–10.5)
CHLORIDE SERPL-SCNC: 100 MMOL/L (ref 95–110)
CHLORIDE SERPL-SCNC: 101 MMOL/L (ref 95–110)
CO2 SERPL-SCNC: 28 MMOL/L (ref 23–29)
CO2 SERPL-SCNC: 28 MMOL/L (ref 23–29)
CO2 SERPL-SCNC: 29 MMOL/L (ref 23–29)
CO2 SERPL-SCNC: 29 MMOL/L (ref 23–29)
CREAT SERPL-MCNC: 0.9 MG/DL (ref 0.5–1.4)
CREAT SERPL-MCNC: 1.1 MG/DL (ref 0.5–1.4)
CREAT SERPL-MCNC: 1.1 MG/DL (ref 0.5–1.4)
CREAT SERPL-MCNC: 1.2 MG/DL (ref 0.5–1.4)
CYSTATIN C SERPL-MCNC: 1.56 MG/L (ref 0.67–1.21)
DIFFERENTIAL METHOD: ABNORMAL
DIFFERENTIAL METHOD: ABNORMAL
EOSINOPHIL # BLD AUTO: 0.1 K/UL (ref 0–0.5)
EOSINOPHIL # BLD AUTO: 0.1 K/UL (ref 0–0.5)
EOSINOPHIL NFR BLD: 2.1 % (ref 0–8)
EOSINOPHIL NFR BLD: 2.2 % (ref 0–8)
ERYTHROCYTE [DISTWIDTH] IN BLOOD BY AUTOMATED COUNT: 15.8 % (ref 11.5–14.5)
ERYTHROCYTE [DISTWIDTH] IN BLOOD BY AUTOMATED COUNT: 16.1 % (ref 11.5–14.5)
EST. GFR  (NO RACE VARIABLE): 46 ML/MIN/1.73 M^2
EST. GFR  (NO RACE VARIABLE): 51 ML/MIN/1.73 M^2
EST. GFR  (NO RACE VARIABLE): 51 ML/MIN/1.73 M^2
EST. GFR  (NO RACE VARIABLE): >60 ML/MIN/1.73 M^2
FERRITIN SERPL-MCNC: 269 NG/ML (ref 20–300)
FERRITIN SERPL-MCNC: 601 NG/ML (ref 20–300)
FOLATE SERPL-MCNC: >40 NG/ML (ref 4–24)
GFR/BSA.PRED SERPLBLD CYS-BASED-ARV: 37 ML/MIN/BSA
GLUCOSE SERPL-MCNC: 107 MG/DL (ref 70–110)
GLUCOSE SERPL-MCNC: 116 MG/DL (ref 70–110)
GLUCOSE SERPL-MCNC: 90 MG/DL (ref 70–110)
GLUCOSE SERPL-MCNC: 93 MG/DL (ref 70–110)
HCT VFR BLD AUTO: 29.3 % (ref 37–48.5)
HCT VFR BLD AUTO: 30.4 % (ref 37–48.5)
HGB BLD-MCNC: 9.3 G/DL (ref 12–16)
HGB BLD-MCNC: 9.6 G/DL (ref 12–16)
IMM GRANULOCYTES # BLD AUTO: 0 K/UL (ref 0–0.04)
IMM GRANULOCYTES # BLD AUTO: 0.01 K/UL (ref 0–0.04)
IMM GRANULOCYTES NFR BLD AUTO: 0 % (ref 0–0.5)
IMM GRANULOCYTES NFR BLD AUTO: 0.2 % (ref 0–0.5)
IRON SERPL-MCNC: 61 UG/DL (ref 30–160)
IRON SERPL-MCNC: 62 UG/DL (ref 30–160)
LYMPHOCYTES # BLD AUTO: 0.7 K/UL (ref 1–4.8)
LYMPHOCYTES # BLD AUTO: 0.7 K/UL (ref 1–4.8)
LYMPHOCYTES NFR BLD: 18 % (ref 18–48)
LYMPHOCYTES NFR BLD: 19.7 % (ref 18–48)
MCH RBC QN AUTO: 31.2 PG (ref 27–31)
MCH RBC QN AUTO: 31.8 PG (ref 27–31)
MCHC RBC AUTO-ENTMCNC: 31.6 G/DL (ref 32–36)
MCHC RBC AUTO-ENTMCNC: 31.7 G/DL (ref 32–36)
MCV RBC AUTO: 100 FL (ref 82–98)
MCV RBC AUTO: 99 FL (ref 82–98)
MONOCYTES # BLD AUTO: 0.5 K/UL (ref 0.3–1)
MONOCYTES # BLD AUTO: 0.5 K/UL (ref 0.3–1)
MONOCYTES NFR BLD: 12.6 % (ref 4–15)
MONOCYTES NFR BLD: 13.9 % (ref 4–15)
NEUTROPHILS # BLD AUTO: 2.1 K/UL (ref 1.8–7.7)
NEUTROPHILS # BLD AUTO: 2.7 K/UL (ref 1.8–7.7)
NEUTROPHILS NFR BLD: 63.7 % (ref 38–73)
NEUTROPHILS NFR BLD: 66.3 % (ref 38–73)
NRBC BLD-RTO: 0 /100 WBC
NRBC BLD-RTO: 0 /100 WBC
PHOSPHATE SERPL-MCNC: 3.4 MG/DL (ref 2.7–4.5)
PLATELET # BLD AUTO: 105 K/UL (ref 150–450)
PLATELET # BLD AUTO: 120 K/UL (ref 150–450)
PMV BLD AUTO: ABNORMAL FL (ref 9.2–12.9)
PMV BLD AUTO: ABNORMAL FL (ref 9.2–12.9)
POTASSIUM SERPL-SCNC: 4.2 MMOL/L (ref 3.5–5.1)
POTASSIUM SERPL-SCNC: 4.5 MMOL/L (ref 3.5–5.1)
POTASSIUM SERPL-SCNC: 4.9 MMOL/L (ref 3.5–5.1)
POTASSIUM SERPL-SCNC: 5.1 MMOL/L (ref 3.5–5.1)
PREALB SERPL-MCNC: 18 MG/DL (ref 20–43)
PROT SERPL-MCNC: 6.6 G/DL (ref 6–8.4)
PROT SERPL-MCNC: 7.4 G/DL (ref 6–8.4)
PROT SERPL-MCNC: 7.6 G/DL (ref 6–8.4)
RBC # BLD AUTO: 2.92 M/UL (ref 4–5.4)
RBC # BLD AUTO: 3.08 M/UL (ref 4–5.4)
SATURATED IRON: 19 % (ref 20–50)
SATURATED IRON: 22 % (ref 20–50)
SODIUM SERPL-SCNC: 138 MMOL/L (ref 136–145)
SODIUM SERPL-SCNC: 138 MMOL/L (ref 136–145)
SODIUM SERPL-SCNC: 139 MMOL/L (ref 136–145)
SODIUM SERPL-SCNC: 139 MMOL/L (ref 136–145)
TOTAL IRON BINDING CAPACITY: 278 UG/DL (ref 250–450)
TOTAL IRON BINDING CAPACITY: 317 UG/DL (ref 250–450)
TRANSFERRIN SERPL-MCNC: 188 MG/DL (ref 200–375)
TRANSFERRIN SERPL-MCNC: 214 MG/DL (ref 200–375)
VIT B12 SERPL-MCNC: 1132 PG/ML (ref 210–950)
WBC # BLD AUTO: 3.3 K/UL (ref 3.9–12.7)
WBC # BLD AUTO: 4.12 K/UL (ref 3.9–12.7)

## 2023-01-01 PROCEDURE — 1160F PR REVIEW ALL MEDS BY PRESCRIBER/CLIN PHARMACIST DOCUMENTED: ICD-10-PCS | Mod: CPTII,S$GLB,, | Performed by: NURSE PRACTITIONER

## 2023-01-01 PROCEDURE — 96365 THER/PROPH/DIAG IV INF INIT: CPT

## 2023-01-01 PROCEDURE — 1126F AMNT PAIN NOTED NONE PRSNT: CPT | Mod: CPTII,S$GLB,, | Performed by: NURSE PRACTITIONER

## 2023-01-01 PROCEDURE — 25000003 PHARM REV CODE 250: Performed by: NURSE PRACTITIONER

## 2023-01-01 PROCEDURE — 3288F PR FALLS RISK ASSESSMENT DOCUMENTED: ICD-10-PCS | Mod: CPTII,S$GLB,, | Performed by: NURSE PRACTITIONER

## 2023-01-01 PROCEDURE — 84134 ASSAY OF PREALBUMIN: CPT | Performed by: INTERNAL MEDICINE

## 2023-01-01 PROCEDURE — 82607 VITAMIN B-12: CPT | Performed by: NURSE PRACTITIONER

## 2023-01-01 PROCEDURE — 63600175 PHARM REV CODE 636 W HCPCS: Performed by: NURSE PRACTITIONER

## 2023-01-01 PROCEDURE — A4216 STERILE WATER/SALINE, 10 ML: HCPCS | Performed by: NURSE PRACTITIONER

## 2023-01-01 PROCEDURE — 82728 ASSAY OF FERRITIN: CPT | Performed by: NURSE PRACTITIONER

## 2023-01-01 PROCEDURE — 1159F PR MEDICATION LIST DOCUMENTED IN MEDICAL RECORD: ICD-10-PCS | Mod: CPTII,S$GLB,, | Performed by: NURSE PRACTITIONER

## 2023-01-01 PROCEDURE — 82746 ASSAY OF FOLIC ACID SERUM: CPT | Performed by: INTERNAL MEDICINE

## 2023-01-01 PROCEDURE — 1157F ADVNC CARE PLAN IN RCRD: CPT | Mod: CPTII,S$GLB,, | Performed by: NURSE PRACTITIONER

## 2023-01-01 PROCEDURE — 99999 PR PBB SHADOW E&M-EST. PATIENT-LVL IV: CPT | Mod: PBBFAC,,, | Performed by: NURSE PRACTITIONER

## 2023-01-01 PROCEDURE — 99999 PR PBB SHADOW E&M-EST. PATIENT-LVL IV: ICD-10-PCS | Mod: PBBFAC,,, | Performed by: NURSE PRACTITIONER

## 2023-01-01 PROCEDURE — 1101F PT FALLS ASSESS-DOCD LE1/YR: CPT | Mod: CPTII,S$GLB,, | Performed by: NURSE PRACTITIONER

## 2023-01-01 PROCEDURE — 3288F FALL RISK ASSESSMENT DOCD: CPT | Mod: CPTII,S$GLB,, | Performed by: NURSE PRACTITIONER

## 2023-01-01 PROCEDURE — 82306 VITAMIN D 25 HYDROXY: CPT | Performed by: INTERNAL MEDICINE

## 2023-01-01 PROCEDURE — 3074F PR MOST RECENT SYSTOLIC BLOOD PRESSURE < 130 MM HG: ICD-10-PCS | Mod: CPTII,S$GLB,, | Performed by: NURSE PRACTITIONER

## 2023-01-01 PROCEDURE — 1160F RVW MEDS BY RX/DR IN RCRD: CPT | Mod: CPTII,S$GLB,, | Performed by: NURSE PRACTITIONER

## 2023-01-01 PROCEDURE — 1159F MED LIST DOCD IN RCRD: CPT | Mod: CPTII,S$GLB,, | Performed by: NURSE PRACTITIONER

## 2023-01-01 PROCEDURE — 1126F PR PAIN SEVERITY QUANTIFIED, NO PAIN PRESENT: ICD-10-PCS | Mod: CPTII,S$GLB,, | Performed by: NURSE PRACTITIONER

## 2023-01-01 PROCEDURE — 3078F DIAST BP <80 MM HG: CPT | Mod: CPTII,S$GLB,, | Performed by: NURSE PRACTITIONER

## 2023-01-01 PROCEDURE — 1101F PR PT FALLS ASSESS DOC 0-1 FALLS W/OUT INJ PAST YR: ICD-10-PCS | Mod: CPTII,S$GLB,, | Performed by: NURSE PRACTITIONER

## 2023-01-01 PROCEDURE — 3074F SYST BP LT 130 MM HG: CPT | Mod: CPTII,S$GLB,, | Performed by: NURSE PRACTITIONER

## 2023-01-01 PROCEDURE — 80076 HEPATIC FUNCTION PANEL: CPT | Performed by: INTERNAL MEDICINE

## 2023-01-01 PROCEDURE — 36415 COLL VENOUS BLD VENIPUNCTURE: CPT | Performed by: INTERNAL MEDICINE

## 2023-01-01 PROCEDURE — 99215 OFFICE O/P EST HI 40 MIN: CPT | Mod: S$GLB,,, | Performed by: NURSE PRACTITIONER

## 2023-01-01 PROCEDURE — 36415 COLL VENOUS BLD VENIPUNCTURE: CPT | Performed by: NURSE PRACTITIONER

## 2023-01-01 PROCEDURE — 1157F PR ADVANCE CARE PLAN OR EQUIV PRESENT IN MEDICAL RECORD: ICD-10-PCS | Mod: CPTII,S$GLB,, | Performed by: NURSE PRACTITIONER

## 2023-01-01 PROCEDURE — 85025 COMPLETE CBC W/AUTO DIFF WBC: CPT | Performed by: NURSE PRACTITIONER

## 2023-01-01 PROCEDURE — 80053 COMPREHEN METABOLIC PANEL: CPT | Performed by: NURSE PRACTITIONER

## 2023-01-01 PROCEDURE — 80069 RENAL FUNCTION PANEL: CPT | Performed by: INTERNAL MEDICINE

## 2023-01-01 PROCEDURE — 3077F PR MOST RECENT SYSTOLIC BLOOD PRESSURE >= 140 MM HG: ICD-10-PCS | Mod: CPTII,S$GLB,, | Performed by: NURSE PRACTITIONER

## 2023-01-01 PROCEDURE — 84466 ASSAY OF TRANSFERRIN: CPT | Performed by: NURSE PRACTITIONER

## 2023-01-01 PROCEDURE — 3077F SYST BP >= 140 MM HG: CPT | Mod: CPTII,S$GLB,, | Performed by: NURSE PRACTITIONER

## 2023-01-01 PROCEDURE — 3078F PR MOST RECENT DIASTOLIC BLOOD PRESSURE < 80 MM HG: ICD-10-PCS | Mod: CPTII,S$GLB,, | Performed by: NURSE PRACTITIONER

## 2023-01-01 PROCEDURE — 82610 CYSTATIN C: CPT | Performed by: INTERNAL MEDICINE

## 2023-01-01 PROCEDURE — 99215 PR OFFICE/OUTPT VISIT, EST, LEVL V, 40-54 MIN: ICD-10-PCS | Mod: S$GLB,,, | Performed by: NURSE PRACTITIONER

## 2023-01-01 PROCEDURE — 80048 BASIC METABOLIC PNL TOTAL CA: CPT | Performed by: INTERNAL MEDICINE

## 2023-01-01 RX ORDER — SODIUM CHLORIDE 0.9 % (FLUSH) 0.9 %
10 SYRINGE (ML) INJECTION
Status: DISCONTINUED | OUTPATIENT
Start: 2023-01-01 | End: 2023-01-01 | Stop reason: HOSPADM

## 2023-01-01 RX ORDER — HEPARIN 100 UNIT/ML
500 SYRINGE INTRAVENOUS
Status: CANCELLED | OUTPATIENT
Start: 2023-01-01

## 2023-01-01 RX ORDER — LORATADINE 10 MG/1
10 TABLET ORAL DAILY
Qty: 30 TABLET | Refills: 2 | Status: SHIPPED | OUTPATIENT
Start: 2023-01-01 | End: 2023-01-01 | Stop reason: SDUPTHER

## 2023-01-01 RX ORDER — LORATADINE 10 MG/1
10 TABLET ORAL DAILY
Qty: 90 TABLET | Refills: 3 | Status: SHIPPED | OUTPATIENT
Start: 2023-01-01 | End: 2023-07-19

## 2023-01-01 RX ORDER — SODIUM CHLORIDE 0.9 % (FLUSH) 0.9 %
10 SYRINGE (ML) INJECTION
Status: CANCELLED | OUTPATIENT
Start: 2023-01-01

## 2023-01-01 RX ORDER — HEPARIN 100 UNIT/ML
500 SYRINGE INTRAVENOUS
Status: DISCONTINUED | OUTPATIENT
Start: 2023-01-01 | End: 2023-01-01 | Stop reason: HOSPADM

## 2023-01-01 RX ORDER — OXYBUTYNIN CHLORIDE 15 MG/1
TABLET, EXTENDED RELEASE ORAL
COMMUNITY
Start: 2023-01-01

## 2023-01-01 RX ADMIN — SODIUM CHLORIDE: 9 INJECTION, SOLUTION INTRAVENOUS at 12:01

## 2023-01-01 RX ADMIN — SODIUM CHLORIDE: 9 INJECTION, SOLUTION INTRAVENOUS at 01:01

## 2023-01-01 RX ADMIN — IRON SUCROSE 200 MG: 20 INJECTION, SOLUTION INTRAVENOUS at 01:05

## 2023-01-01 RX ADMIN — IRON SUCROSE 100 MG: 20 INJECTION, SOLUTION INTRAVENOUS at 01:01

## 2023-01-01 RX ADMIN — Medication 10 ML: at 02:05

## 2023-01-01 RX ADMIN — Medication 10 ML: at 03:05

## 2023-01-01 RX ADMIN — Medication 10 ML: at 02:01

## 2023-01-01 RX ADMIN — SODIUM CHLORIDE: 9 INJECTION, SOLUTION INTRAVENOUS at 01:05

## 2023-01-05 PROBLEM — R91.8 MULTIPLE PULMONARY NODULES: Status: ACTIVE | Noted: 2023-01-01

## 2023-01-05 PROBLEM — Z79.01 LONG TERM CURRENT USE OF ANTICOAGULANTS WITH INR GOAL OF 2.0-3.0: Status: ACTIVE | Noted: 2022-01-01

## 2023-01-05 PROBLEM — J47.9 BRONCHIECTASIS: Status: ACTIVE | Noted: 2023-01-01

## 2023-01-05 PROBLEM — F33.9 DEPRESSION, RECURRENT: Status: ACTIVE | Noted: 2023-01-01

## 2023-01-05 PROBLEM — T46.2X1A AMIODARONE TOXICITY: Status: ACTIVE | Noted: 2023-01-01

## 2023-01-05 PROBLEM — N18.31 STAGE 3A CHRONIC KIDNEY DISEASE: Status: ACTIVE | Noted: 2023-01-01

## 2023-01-05 PROBLEM — R62.7 FTT (FAILURE TO THRIVE) IN ADULT: Status: ACTIVE | Noted: 2022-03-09

## 2023-01-05 PROBLEM — D71 GRANULOMATOUS DISEASE: Status: ACTIVE | Noted: 2023-01-01

## 2023-01-05 PROBLEM — J96.11 CHRONIC RESPIRATORY FAILURE WITH HYPOXIA: Status: ACTIVE | Noted: 2023-01-01

## 2023-01-05 PROBLEM — I07.1 TR (TRICUSPID REGURGITATION): Status: ACTIVE | Noted: 2022-03-09

## 2023-01-05 PROBLEM — I35.1 AORTIC VALVE REGURGITATION: Status: ACTIVE | Noted: 2023-01-01

## 2023-01-05 PROBLEM — Z98.890 H/O TRICUSPID VALVE ANNULOPLASTY: Status: ACTIVE | Noted: 2022-03-09

## 2023-01-05 PROBLEM — I34.0 MR (MITRAL REGURGITATION): Status: ACTIVE | Noted: 2022-03-09

## 2023-01-05 PROBLEM — Z95.3 HISTORY OF MITRAL VALVE REPLACEMENT WITH BIOPROSTHETIC VALVE: Status: ACTIVE | Noted: 2022-03-09

## 2023-01-05 PROBLEM — I48.19 PERSISTENT ATRIAL FIBRILLATION: Status: ACTIVE | Noted: 2023-01-01

## 2023-01-18 NOTE — NURSING
Pt tolerated venofer 3/4 infusion well.  No adverse reaction noted.   IV flushed with NS and D/C per protocol.  Patient left clinic in no acute distress.

## 2023-01-25 NOTE — NURSING
Pt tolerated venofer 4/4  infusion well.  No adverse reaction noted.   IV flushed with NS and D/C per protocol.  Patient left clinic in no acute distress.

## 2023-03-31 NOTE — PROGRESS NOTES
PATIENT: Noemy Niño  MRN: [id number]  DATE: 3/31/2023    Chief Complaint: anemia    Subjective:     History of Present Illness:   Hpi as per Dr García's office visit 6/1/22    PCP -Dr.Haresh Rodriguez    Referred for cachexia evaluation    Her baseline weight is 115 lb    August 2021, she weighed 103 lb    February 2022, weight 88 lb    Outside records reviewed    CT chest with contrast February 2, 2022 - no suspicious mass or lymphadenopathy.  Left pacemaker, prior sternotomy, mitral valve prosthesis.  Multichamber cardiac enlargement.    CT abdomen/pelvis with contrast February 2, 2022-few punctate renal stones and subcentimeter hypodensities, likely simple cysts.  Question gastric mucosal thickening/atrophy.  No focal mass or surrounding inflammation.  No bowel obstruction.  No suspicious mass or lymphadenopathy.  Unremarkable liver, spleen and pancreas.    CBC January 24, 2022-hemoglobin 8.1, WBC 3.2, MCV 94, platelets 171, glucose 122, creatinine 1.1, albumin 4.4, bilirubin 1.6, AST 44, ALT 10, TSH 0.8, ferritin 51      -severe cachexia workup reviewed and reveals macrocytic anemia with functional iron deficiency.  Hepatitis B/C and HIV serology negative.  TACOS unremarkable.  SPEP and immunofixation negative.  Retic count and erythropoietin level only mildly elevated.  LDH grossly elevated at 1142  -follows with Cardiology, Dr. Kendrick Cardenas at [facility]  -bone marrow biopsy 03/24/2022 unremarkable  -getting IV iron, Venofer, feels better    INTERVAL HISTORY:   -here for anemia and cachexia follow up  -received venofer x4 doses 11/2022, feels these did not boost her like previous infusion did  -received venofer x4 doses  1/2023  -states she still gets tired and takes nap during afternoon, has LUGO  -was excited that today she gained a pound weight 85 # last visit; today she is 89#  -she sees pulmonology at [facility] with astham, bronchiolitis, lung nodule, pulmonary htn, chronic respiratory failure; 10/10/22  pulmonologist discussed with pt 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  -she reports she is still getting around fairly well driving ownself to appts from [place] to here in [place] or appts at [facility], her three sons handle shopping now and frequently bring her meals with some to put in freezer.  -Denies fever, chest pain, abdominal pain, n/v/d, constipation, hemoptysis, hematemesis, melena, hematuria. Decreased appetite continues but she has had weight gain as above.   -remains on long term coumadin for afib history, pcp considered changing to eliquis. Pt states has pig valva and cardiology states she must stay on coumadin.      Past Medical, Surgical, Family and Social History Reviewed.    Medications and Allergies reviewed    Review of Systems   Constitutional:  Positive for fatigue. Negative for fever and unexpected weight change.        Weight has been stable   HENT:  Negative for nosebleeds.    Respiratory:  Positive for shortness of breath (san). Negative for cough.    Cardiovascular:  Negative for chest pain.   Gastrointestinal:  Negative for blood in stool, diarrhea, nausea and vomiting.   Endocrine: Positive for cold intolerance.   Genitourinary:  Negative for hematuria.        Mirabegron 50 mg daily reports this has significantly improved her urinary symptoms since last visit, following Dr Gonzalez/urology   Neurological:  Positive for weakness (generalized weakness).   Hematological:  Negative for adenopathy.   Objective:     Vitals:    03/31/23 1311   BP: (!) 151/64   BP Location: Left arm   Patient Position: Sitting   BP Method: Medium (Automatic)   Pulse: 83   Resp: 16   SpO2: 98%   Weight: 40.4 kg (89 lb 1.1 oz)         BMI: Body mass index is 13.95 kg/m².    Physical Exam  Vitals and nursing note reviewed.   Constitutional:       General: She is not in acute distress.     Comments: cachetic   HENT:      Right Ear: External ear  normal.      Left Ear: External ear normal.      Mouth/Throat:      Mouth: Mucous membranes are moist.      Pharynx: Oropharynx is clear.   Eyes:      General: No scleral icterus.     Comments: Pallor bilat conjunctivae   Cardiovascular:      Rate and Rhythm: Normal rate and regular rhythm.      Pulses: Normal pulses.      Heart sounds: Normal heart sounds. No murmur heard.     Comments: Thin skin overlying pacemaker, pacemaker prominently notable  Pulmonary:      Effort: Pulmonary effort is normal. No respiratory distress.      Breath sounds: Normal breath sounds.   Abdominal:      General: Bowel sounds are normal. There is no distension.      Palpations: Abdomen is soft.      Tenderness: There is no abdominal tenderness.   Musculoskeletal:      Cervical back: Normal range of motion and neck supple. No rigidity.      Right lower leg: No edema.      Left lower leg: No edema.   Lymphadenopathy:      Cervical: No cervical adenopathy.   Skin:     General: Skin is warm and dry.      Coloration: Skin is not jaundiced.   Neurological:      Mental Status: She is alert and oriented to person, place, and time. Mental status is at baseline.      Gait: Gait normal.   Psychiatric:         Mood and Affect: Mood normal.         Behavior: Behavior normal.     Lab Results   Component Value Date    WBC 3.63 (L) 12/27/2022    HGB 9.3 (L) 12/27/2022    HCT 29.8 (L) 12/27/2022    MCV 96 12/27/2022     (L) 12/27/2022       Lab Results   Component Value Date    IRON 61 03/29/2023    TRANSFERRIN 214 03/29/2023    TIBC 317 03/29/2023    FESATURATED 19 (L) 03/29/2023      Lab Results   Component Value Date    FERRITIN 269 03/29/2023     T sat 19.24% (improved from previous of 17.74%)    Assessment:       1. Nutritional anemia, unspecified    2. Anemia, chronic disease    3. Other iron deficiency anemias    4. Thrombocytopenia, unspecified    5. Stage 3a chronic kidney disease    6. Cachexia    7. Panlobular emphysema    8.  Portopulmonary hypertension    9. Chronic atrial fibrillation    10. OAB (overactive bladder)            Plan:   Anemia 2/2 Chronic Disease and CKD, ANALI, nutritional anemia  -bone marrow biopsy 03/24/2022 - unremarkable  -felt better after Venofer but fatigue returning, had previously been given epoetin anna-epbx injection   -labs CBC and iron studies reviewed, 12/27/22 labs with increase in H/H to 9.3/29.8, low iron sat, normal tibc and normal ferritin. Goal ferritin 200.  -received venofer x4 doses 11/2022, will order repeat of venofer x4 doses  -received venofer x4 doses 1/2023  -iron studies 3/29/23 stable wit iron 61, iron sat improved to 19%, TIBC 317 ug/dL, T sat 19.24% (improved from previous of 17.74%)  -no cbc completed on 3/29/23, will update today and call pt with results, additionally will update B12  -will repeat CBC and iron studies in 3  months    Thrombocytopenia  -mild decrease on 12/27/22 labs, stable  -continue to monitor at this time    CKD stage 3a  -12/27/22 labs ckd stable, will update cmp today and call pt with results  -continue to monitor    Cachexia  -CT abdomen/pelvis/chest largely unremarkable  -increase of 4 pounds since last visit  -fmanagement deferred to pcp    Pulmonary fibrosis, panlobular emphysema, portopulmonary hypertension  -follows with pulmonology, last appt 10/10/22    Chronic afib  -chronic coumadin, stable  -continue coumadin clinic follow up, management deferred to cardiology    Overactive bladder  -improved with mirabegron 50 mg daily   -management deferred to urology/Dr Marva Javier, NP-C  [facility]  Hematology/Oncology  [address] [facility] [address]  [place]  [phone]

## 2023-04-03 NOTE — TELEPHONE ENCOUNTER
Done, see documented phone call with labs.    ----- Message from Benjamin Segura MA sent at 4/3/2023  2:19 PM CDT -----  Hey, this patient called about seeing if you want to see her earlier. She is nervous about her test results that she's received and wanted to speak with you directly but I told them ill give them a call back once I spoke with you.

## 2023-04-03 NOTE — TELEPHONE ENCOUNTER
Reviewed labs with pt via telephone call. Given T sat calculation with iron studies is 19/24%, will order iv iron infusions. H/H 9.6/30.4 with microcytic and macrocytic evidence. CKD is stable.

## 2023-04-04 NOTE — TELEPHONE ENCOUNTER
Confirmed upcoming infusion appointments with the patient  5/2 at 130p  5/9 at 130p  5/16 at 130p  523 at 130p

## 2023-04-06 NOTE — TELEPHONE ENCOUNTER
Pt advised that she contacted this office by mistake. She was trying to contact her PCP office. I voiced my understanding.

## 2023-04-06 NOTE — TELEPHONE ENCOUNTER
----- Message from Marcin Sawyer LPN sent at 4/6/2023  3:27 PM CDT -----    ----- Message -----  From: Felipa Byers MA  Sent: 4/6/2023  10:39 AM CDT  To: [facility] Urology Clinical Staff      ----- Message -----  From: Lupe Tamez  Sent: 4/6/2023   9:39 AM CDT  To: [facility]    Type:  Patient Requesting Referral    Who Called:pt  Does the patient already have the specialty appointment scheduled?:no  If yes, what is the date of that appointment?:n/a  Referral to What Specialty:Urology  Reason for Referral:kidney issues  Does the patient want the referral with a specific physician?:no  [facility]  Patient Requesting a Response?:yes  Would the patient rather a call back or a response via [facility]? call  Best Call Back Number:[phone]  Additional Information:

## 2023-04-10 PROBLEM — J96.11 CHRONIC RESPIRATORY FAILURE WITH HYPOXIA: Status: RESOLVED | Noted: 2023-01-01 | Resolved: 2023-01-01

## 2023-05-01 NOTE — TELEPHONE ENCOUNTER
I contacted the patient regarding her medication mirabegron (MYRBETRIQ) 50 mg Tb24. Patient voiced that the pharmacy called and notified her that the automatic refills are out. I voiced Per : I send 11 refills on 11/23 and that she should contact her pharmacy on how many refills she has left. Patient agreed on calling.

## 2023-05-01 NOTE — TELEPHONE ENCOUNTER
----- Message from Ambika Peck sent at 5/1/2023 11:02 AM CDT -----  Type:  RX Refill Request    Who Called: pt  Refill or New Rx:refill  RX Name and Strength:mirabegron (MYRBETRIQ) 50 mg Tb24  How is the patient currently taking it? (ex. 1XDay): Take 1 tablet (50 mg total) by mouth once daily  Is this a 30 day or 90 day RX:30  Preferred Pharmacy with phone number:[facility]/pharmacy #[id number] - [place] - [address] [address]   Phone: [phone]  Fax:  [phone]  Local or Mail Order:local  Ordering Provider:Dr Gonzalez  Would the patient rather a call back or a response via [facility]? call  Best Call Back Number:[phone]   Additional Information:

## 2023-07-03 NOTE — PROGRESS NOTES
"PATIENT: Noemy Niño  MRN: [id number]  DATE: 7/3/2023    Chief Complaint: anemia    Subjective:     History of Present Illness:   Hpi as per Dr García's office visit 6/1/22    PCP -Dr.Haresh Rodriguez    Referred for cachexia evaluation    Her baseline weight is 115 lb    August 2021, she weighed 103 lb    February 2022, weight 88 lb    Outside records reviewed    CT chest with contrast February 2, 2022 - no suspicious mass or lymphadenopathy.  Left pacemaker, prior sternotomy, mitral valve prosthesis.  Multichamber cardiac enlargement.    CT abdomen/pelvis with contrast February 2, 2022-few punctate renal stones and subcentimeter hypodensities, likely simple cysts.  Question gastric mucosal thickening/atrophy.  No focal mass or surrounding inflammation.  No bowel obstruction.  No suspicious mass or lymphadenopathy.  Unremarkable liver, spleen and pancreas.    CBC January 24, 2022-hemoglobin 8.1, WBC 3.2, MCV 94, platelets 171, glucose 122, creatinine 1.1, albumin 4.4, bilirubin 1.6, AST 44, ALT 10, TSH 0.8, ferritin 51      -severe cachexia workup reviewed and reveals macrocytic anemia with functional iron deficiency.  Hepatitis B/C and HIV serology negative.  TACOS unremarkable.  SPEP and immunofixation negative.  Retic count and erythropoietin level only mildly elevated.  LDH grossly elevated at 1142  -follows with Cardiology, Dr. Kendrick Cardenas at [facility]  -bone marrow biopsy 03/24/2022 unremarkable  -getting IV iron, Venofer, feels better    INTERVAL HISTORY:   -here for anemia and cachexia follow up  -received venofer x4 doses 11/2022, feels these did not boost her like previous infusion did  -received venofer x4 doses  1/2023  -received venofer x4 doses  5/2023  -states she is "finally not as tired and I don't need a nap every afternoon"  -she reports she is still getting around fairly well driving self to appts from [place] to [facility] and appts at [facility], 3 sons handle shopping and  bring her " meals.  -Denies fever, chest pain, abdominal pain, n/v/d, constipation, hemoptysis, hematemesis, melena, hematuria. Decreased appetite continues but weight has remained stable.    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    -remains on long term coumadin for afib, pcp considered changing to eliquis. 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      Past Medical, Surgical, Family and Social History Reviewed.    Medications and Allergies reviewed    Review of Systems   Constitutional:  Positive for fatigue. Negative for fever and unexpected weight change.        Weight has been stable   HENT:  Negative for nosebleeds.    Respiratory:  Positive for shortness of breath (san). Negative for cough.    Cardiovascular:  Negative for chest pain.   Gastrointestinal:  Negative for blood in stool, diarrhea, nausea and vomiting.   Endocrine: Positive for cold intolerance.   Genitourinary:  Negative for hematuria.   Neurological:  Positive for weakness.   Hematological:  Negative for adenopathy.   Objective:     Vitals:    07/03/23 1353   BP: (!) 119/56   BP Location: Left arm   Patient Position: Sitting   BP Method: Medium (Automatic)   Pulse: 72   Resp: 18   SpO2: 97%   Weight: 38.3 kg (84 lb 7 oz)           BMI: Body mass index is 13.22 kg/m².    Physical Exam  Vitals and nursing note reviewed.   Constitutional:       General: She is not in acute distress.     Comments: cachetic   HENT:      Right Ear: External ear normal.      Left Ear: External ear normal.      Mouth/Throat:      Mouth: Mucous membranes are moist.      Pharynx: Oropharynx is clear.   Eyes:      General: No scleral icterus.     Comments: Pallor bilat conjunctivae   Cardiovascular:      Rate and Rhythm: Normal rate and regular rhythm.       Pulses: Normal pulses.      Heart sounds: Normal heart sounds. No murmur heard.     Comments: Thin skin overlying pacemaker, pacemaker prominently notable  Pulmonary:      Effort: Pulmonary effort is normal. No respiratory distress.      Breath sounds: Normal breath sounds.   Abdominal:      General: Bowel sounds are normal. There is no distension.      Palpations: Abdomen is soft.      Tenderness: There is no abdominal tenderness.   Musculoskeletal:      Cervical back: Normal range of motion and neck supple. No rigidity.      Right lower leg: No edema.      Left lower leg: No edema.   Lymphadenopathy:      Cervical: No cervical adenopathy.   Skin:     General: Skin is warm and dry.      Coloration: Skin is not jaundiced.   Neurological:      Mental Status: She is alert and oriented to person, place, and time. Mental status is at baseline.      Gait: Gait normal.   Psychiatric:         Mood and Affect: Mood normal.         Behavior: Behavior normal.     Lab Results   Component Value Date    WBC 3.30 (L) 06/29/2023    HGB 9.3 (L) 06/29/2023    HCT 29.3 (L) 06/29/2023     (H) 06/29/2023     (L) 06/29/2023       Lab Results   Component Value Date    IRON 62 06/29/2023    TRANSFERRIN 188 (L) 06/29/2023    TIBC 278 06/29/2023    FESATURATED 22 06/29/2023      Lab Results   Component Value Date    FERRITIN 601 (H) 06/29/2023 6/29/23 labs: T sat 22.30% , improved  3/29/23 labs: T sat 19.24% improved from previous of 17.74%    Assessment:       1. Anemia, chronic disease    2. Other iron deficiency anemias    3. Nutritional anemia, unspecified    4. Anemia due to stage 3a chronic kidney disease    5. Thrombocytopenia, unspecified    6. Stage 3a chronic kidney disease    7. Cachexia    8. Panlobular emphysema    9. Portopulmonary hypertension    10. Chronic atrial fibrillation    11. OAB (overactive bladder)        Plan:   Anemia 2/2 Chronic Disease and CKD, ANALI, nutritional anemia  -bone marrow biopsy  03/24/2022 - unremarkable  -felt better after Venofer but fatigue returning, had previously been given epoetin anna-epbx injection   -labs CBC and iron studies reviewed, 12/27/22 labs with increase in H/H to 9.3/29.8, low iron sat, normal tibc and normal ferritin. Goal ferritin 200.  -received venofer x4 doses 11/2022  -received venofer x4 doses 1/2023  -iron studies 3/29/23 stable iron 61, iron sat improved to 19%, TIBC 317 ug/dL, T sat 19.24% (improved from previous of 17.74%)  -received venofer x4 doses  5/2023  -6/29/23 hgb stable 9.3 g/dL, iron normal with T sat improved to 22.30%, ANALI currently resolved  -she will try an otc iron tab MWF and monitor for constipation, she has never been on iron orally before she states  -will repeat CBC and iron studies in 3  months    Thrombocytopenia  -105 K/uL (6/29/23), stable with no bleeding issues  -continue to monitor at this time    CKD stage 3a  -6/29/23 GFR 51, ckd stable  -management deferred to nephrology    Cachexia  -CT abdomen/pelvis/chest largely unremarkable  -weight stable  -management deferred to pcp    Pulmonary fibrosis, panlobular emphysema, portopulmonary hypertension  -stable  -management deferred to pulmonology    Chronic afib  -chronic coumadin, stable  -continue coumadin clinic follow up, management deferred to cardiology    Overactive bladder  -improved with mirabegron 50 mg daily   -management deferred to urology/Dr Marva Javier, NP-C  [facility]  Hematology/Oncology  [address]  [place]  [id number] [phone]

## 2024-01-13 NOTE — TELEPHONE ENCOUNTER
Reason for Disposition   General information question, no triage required and triager able to answer question    Protocols used: INFORMATION ONLY CALL-A-AH    Removing electrodes from skin.  
Negative

## 2024-08-18 NOTE — NURSING
Pt received IV Venofer infusion dose 2/4. Pt tolerated it well. AVS given. Next appointment scheduled. Discharged with no acute distress.   
Walk in

## (undated) DEVICE — KIT PACEMAKER CUSTOM KENNER

## (undated) DEVICE — BLADE PLASMA WIDE SPATULA TIP

## (undated) DEVICE — SUT 3/0 27IN COATED VICRYL

## (undated) DEVICE — PAD DEFIB CADENCE ADULT R2

## (undated) DEVICE — DRAPE INCISE IOBAN 2 23X17IN

## (undated) DEVICE — SCALPEL SZ 15 RETRACTABLE

## (undated) DEVICE — CLOSURE SKIN STERI STRIP 1/2X4

## (undated) DEVICE — SUT 2-0 VICRYL / FS-1

## (undated) DEVICE — PAD GROUNDING DISPOABLE